# Patient Record
Sex: MALE | Race: WHITE | NOT HISPANIC OR LATINO | Employment: OTHER | URBAN - NONMETROPOLITAN AREA
[De-identification: names, ages, dates, MRNs, and addresses within clinical notes are randomized per-mention and may not be internally consistent; named-entity substitution may affect disease eponyms.]

---

## 2017-01-19 ENCOUNTER — OFFICE VISIT (OUTPATIENT)
Dept: CARDIOLOGY | Facility: CLINIC | Age: 77
End: 2017-01-19
Payer: MEDICARE

## 2017-01-19 VITALS
OXYGEN SATURATION: 94 % | HEIGHT: 72 IN | HEART RATE: 77 BPM | SYSTOLIC BLOOD PRESSURE: 140 MMHG | WEIGHT: 192 LBS | BODY MASS INDEX: 26.01 KG/M2 | DIASTOLIC BLOOD PRESSURE: 62 MMHG

## 2017-01-19 DIAGNOSIS — Z79.899 HIGH RISK MEDICATION USE: ICD-10-CM

## 2017-01-19 DIAGNOSIS — E78.2 MIXED HYPERLIPIDEMIA: ICD-10-CM

## 2017-01-19 DIAGNOSIS — I48.0 PAROXYSMAL ATRIAL FIBRILLATION (HCC): ICD-10-CM

## 2017-01-19 DIAGNOSIS — Z79.01 CHRONIC ANTICOAGULATION: ICD-10-CM

## 2017-01-19 LAB — EKG IMPRESSION: NORMAL

## 2017-01-19 PROCEDURE — 99215 OFFICE O/P EST HI 40 MIN: CPT | Performed by: INTERNAL MEDICINE

## 2017-01-19 PROCEDURE — 93000 ELECTROCARDIOGRAM COMPLETE: CPT | Performed by: INTERNAL MEDICINE

## 2017-01-19 RX ORDER — SOTALOL HYDROCHLORIDE 120 MG/1
120 TABLET ORAL 2 TIMES DAILY
Qty: 60 TAB | Refills: 11 | Status: SHIPPED | OUTPATIENT
Start: 2017-01-19 | End: 2018-01-16 | Stop reason: SDUPTHER

## 2017-01-19 ASSESSMENT — ENCOUNTER SYMPTOMS
DIZZINESS: 0
PALPITATIONS: 0
MYALGIAS: 0
CLAUDICATION: 0
SHORTNESS OF BREATH: 0
PND: 0
INSOMNIA: 0
SEIZURES: 0
BLURRED VISION: 0
DOUBLE VISION: 0
ABDOMINAL PAIN: 0
CHILLS: 0
HEADACHES: 0
NERVOUS/ANXIOUS: 0
BLOOD IN STOOL: 0
DEPRESSION: 0
FEVER: 0
ORTHOPNEA: 0

## 2017-01-19 NOTE — Clinical Note
[facility] [place] [address],   [address]nd [place] [address]  Phone: [phone]  Fax: [phone]              Sherwin Hennessy  1940    Encounter Date: 1/19/2017    Sandy Shepard M.D.          PROGRESS NOTE:  Subjective:   Sherwin Hennessy is a 76 y.o. male with known history of coronary atherosclerosis, paroxysmal atrial fibrillation on sotalol and also on anticoagulation with Eliquis, hypertension, dyslipidemia presenting today for follow-up evaluation of atrial fibrillation.    In spite of patient being on sotalol 80 mg BID he does have breakthrough episodes of atrial fibrillation at least once every 3-4 months. Does complain of fatigue and tiredness when he goes into A. fib. No complaints of exertional chest pain, pressure or tightness. Denied any complaints of dizziness, lightheadedness or LOC. No bleeding issues while on anti-coagulation. No complaints of claudication.    Past Medical History   Diagnosis Date   • History of chickenpox    • Heart disease    • History of measles    • History of mumps    • Hypertension    • Heartburn    • Arthritis    • Snoring    • Hiatal hernia      Past Surgical History   Procedure Laterality Date   • Vasectomy     • Inguinal hernia repair     • Eye surgery Bilateral      cataract sx     Family History   Problem Relation Age of Onset   • Hypertension Mother    • Heart Disease Father    • Cancer Sister    • Cancer Brother    • Arthritis Other    • Diabetes Other      History   Smoking status   • Never Smoker    Smokeless tobacco   • Never Used     Allergies   Allergen Reactions   • Amiodarone Unspecified     Felt like he was going to die   • Food      Watermelon, cantaloupe, strawberries, apples     Outpatient Encounter Prescriptions as of 1/19/2017   Medication Sig Dispense Refill   • hydrochlorothiazide (HYDRODIURIL) 12.5 MG tablet Take 12.5 mg by mouth every day.     • tramadol (ULTRAM) 50 MG Tab Take  1 Tab by mouth 2 Times a Day. 180 Tab 0   • terazosin (HYTRIN) 5 MG Cap Take 1 Cap by mouth every day. 90 Cap 1   • sotalol (BETAPACE) 80 MG Tab Take 1 Tab by mouth 2 times a day. 180 Tab 1   • simvastatin (ZOCOR) 10 MG Tab Take 1 Tab by mouth every evening. 60 Tab 3   • gabapentin (NEURONTIN) 300 MG Cap Take 1 Cap by mouth every day. 90 Cap 1   • predniSONE (DELTASONE) 5 MG Tab Take 1 Tab by mouth every day. 30 Tab 0   • apixaban (ELIQUIS) 5mg Tab Take 1 Tab by mouth 2 Times a Day. 180 Tab 1   • cephALEXin (KEFLEX) 500 MG Cap Take 1 Cap by mouth 4 times a day. 28 Cap 0   • omeprazole (PRILOSEC) 20 MG delayed-release capsule Take 1 Cap by mouth every day. 90 Cap 1   • Cyanocobalamin (VITAMIN B-12 PO) Take 2,000 mcg by mouth every day.       No facility-administered encounter medications on file as of 1/19/2017.     Review of Systems   Constitutional: Negative for fever and chills.   HENT: Positive for hearing loss. Negative for tinnitus.    Eyes: Negative for blurred vision and double vision.   Respiratory: Negative for shortness of breath.    Cardiovascular: Negative for chest pain, palpitations, orthopnea, claudication, leg swelling and PND.   Gastrointestinal: Negative for abdominal pain and blood in stool.   Genitourinary: Negative for dysuria and hematuria.   Musculoskeletal: Negative for myalgias and joint pain.   Skin: Negative for rash.   Neurological: Negative for dizziness, seizures and headaches.   Psychiatric/Behavioral: Negative for depression. The patient is not nervous/anxious and does not have insomnia.         Objective:   /62 mmHg  Pulse 77  Ht 1.829 m (6')  Wt 87.091 kg (192 lb)  BMI 26.03 kg/m2  SpO2 94%    Physical Exam   Constitutional: He is oriented to person, place, and time. He appears well-developed and well-nourished. No distress.   HENT:   Head: Normocephalic and atraumatic.   Mouth/Throat: No oropharyngeal exudate.   Eyes: Pupils are equal, round, and reactive to light. Right  eye exhibits no discharge. Left eye exhibits no discharge.   Neck: No JVD present. No tracheal deviation present.   Cardiovascular: Normal rate.    No murmur heard.  Pulmonary/Chest: Effort normal and breath sounds normal. No respiratory distress. He has no wheezes. He has no rales.   Abdominal: Soft. Bowel sounds are normal. He exhibits no distension. There is no tenderness. There is no rebound.   Musculoskeletal: Normal range of motion. He exhibits no edema.   Neurological: He is alert and oriented to person, place, and time. No cranial nerve deficit.   Skin: Skin is warm and dry. He is not diaphoretic. No erythema.   Psychiatric: He has a normal mood and affect.      EK17  EKG personally reviewed by me.  Sinus rhythm 62 bpm normal axis QTc interval of 451 ms.    CT chest: 12/15/16  1.  No evidence for parenchymal lung mass. The abnormality seen on chest x-ray likely represented artifact.  2.  Status post cholecystectomy with pneumobilia likely indicative of prior sphincterotomy.  3.  Coronary atherosclerosis    EK16  EKG personally reviewed by me.  Sinus rhythm 65 bpm normal axis no significant ST or T-wave changes.    Results for GUILLE UP (MRN [id number]) as of 2017 14:00   2016    Sodium 136 143    Potassium 3.0 (LL) 4.1    Chloride 99 107    Co2 28 29    Anion Gap 12 11    Glucose 152 (H) 109 (H)    Bun 22 (H) 16    Creatinine 0.9 1.1    GFR If Non African American >60 >60    Calcium 9.1 9.0    AST(SGOT) 72 (H) 23    ALT(SGPT) 92 (H) 23    Alkaline Phosphatase 238 (H) 80    Total Bilirubin 9.8 (H) 0.7    Albumin 3.2 (L) 3.8    Total Protein 7.0 7.2    A-G Ratio 0.8 1.1    Lipase 3270 (H)     Glycohemoglobin   5.8 (H)   Estim. Avg Glu   120   Cholesterol,Tot  149    Triglycerides  139    HDL  42.0    Non HDL Cholesterol  107    LDL  79    Chol-Hdl Ratio  3.55    Prostatic Specific Antigen Tot  5.19 (H)      Assessment:     1. Coronary atherosclerosis  2.  Paroxysmal atrial fibrillation  3. Chronic anticoagulation  4. Dyslipidemia    Medical Decision Making:  Today's Assessment / Status / Plan:     1. Patient had a nuclear stress test approximately 3 years ago in [place] according to patient stress test was negative for ischemia.  Offered him regular treadmill stress test which he refused.  2. She is having breakthrough episodes of atrial fibrillation while on sotalol 80 mg BID.  Will increase the dose to 120 mg BID-which is going to start next Monday.  Advised patient to come to our office for next 3 days for EKG formation QTc interval.  EKG from today showed QTc interval of 451 ms.   Labs from/16 showed GFR more than 60.  3. Continue Eliquis 5 mg BID.  Recent blood work showed normal renal function.  4. Increase Zocor to 20 mg qHs.  Target LDL less than 70.    Follow-up in 3-4 weeks.    Thank you for allowing me to participate in taking care of patient.    Sandy Gentile MD.      Shen Granger M.D.  [address] [facility] [address]  VIA In Basket

## 2017-01-19 NOTE — MR AVS SNAPSHOT
Sherwin Hennessy   2017 2:00 PM   Office Visit   MRN: [id number]    Department:  Heart [facility]   Dept Phone:  [phone]    Description:  Male : 1940   Provider:  Sandy Shepard M.D.           Reason for Visit     Follow-Up           Allergies as of 2017     Allergen Noted Reactions    Amiodarone 2016   Unspecified    [place] like he was going to die    Food 2016       Watermelon, cantaloupe, strawberries, apples      You were diagnosed with     Paroxysmal atrial fibrillation (CMS-HCC)   [850926]       Mixed hyperlipidemia   [272.2.ICD-9-CM]       Chronic anticoagulation   [242391]       High risk medication use   [303023]         Vital Signs     Blood Pressure Pulse Height Weight Body Mass Index Oxygen Saturation    140/62 mmHg 77 1.829 m (6') 87.091 kg (192 lb) 26.03 kg/m2 94%    Smoking Status                   Never Smoker            Basic Information     Date Of Birth Sex Race Ethnicity Preferred Language    1940 Male White Non- English      Your appointments     2017  1:10 PM   [facility]    [address]  [address]  [facility] [address] [phone]           No prep            2017 11:40 AM   [facility] with [facility]    [address]  [address]  [facility] [address] [phone]           No prep            2017  3:00 PM   FOLLOW UP with Sandy Shepard M.D.   [facility]    [address]  [address]  [facility] [address]   [phone]            2017  8:30 AM   15 Minute with Shen Granger M.D.   [facility] (--)    [address]  [facility] [address]   [phone]              Problem List              ICD-10-CM Priority Class Noted -  Resolved    Esophageal reflux K21.9   5/31/2016 - Present    Atrial fibrillation (CMS-HCC) I48.91   5/31/2016 - Present    BPH (benign prostatic hypertrophy) N40.0   5/31/2016 - Present    Hyperlipidemia E78.5   5/31/2016 - Present    Peripheral neuropathy (CMS-HCC) G62.9   5/31/2016 - Present    Elevated PSA R97.20   5/31/2016 - Present    Hiatal hernia K44.9   5/31/2016 - Present      Health Maintenance        Date Due Completion Dates    IMM DTaP/Tdap/Td Vaccine (1 - Tdap) 1/2/2010 1/1/2010    COLONOSCOPY 1/1/2025 1/1/2015 (Done)    Override on 1/1/2015: Done (per NP papers)            Current Immunizations     13-VALENT PCV PREVNAR 3/30/2016    Influenza TIV (IM) 5/1/2015    Influenza Vaccine Adult HD 10/25/2016  9:45 AM    Pneumococcal polysaccharide vaccine (PPSV-23) 1/1/2015    SHINGLES VACCINE 1/1/2015    TD Vaccine 1/1/2010      Below and/or attached are the medications your provider expects you to take. Review all of your home medications and newly ordered medications with your provider and/or pharmacist. Follow medication instructions as directed by your provider and/or pharmacist. Please keep your medication list with you and share with your provider. Update the information when medications are discontinued, doses are changed, or new medications (including over-the-counter products) are added; and carry medication information at all times in the event of emergency situations     Allergies:  AMIODARONE - Unspecified     FOOD - (reactions not documented)               Medications  Valid as of: January 19, 2017 -  2:20 PM    Generic Name Brand Name Tablet Size Instructions for use    Apixaban (Tab) ELIQUIS 5mg Take 1 Tab by mouth 2 Times a Day.        Cephalexin (Cap) KEFLEX 500 MG Take 1 Cap by mouth 4 times a day.        Cyanocobalamin   Take 2,000 mcg by mouth every day.        Gabapentin (Cap) NEURONTIN 300 MG Take 1 Cap by mouth every day.        HydroCHLOROthiazide (Tab) HYDRODIURIL 12.5 MG Take 12.5  mg by mouth every day.        Omeprazole (CAPSULE DELAYED RELEASE) PRILOSEC 20 MG Take 1 Cap by mouth every day.        PredniSONE (Tab) DELTASONE 5 MG Take 1 Tab by mouth every day.        Simvastatin (Tab) ZOCOR 10 MG Take 1 Tab by mouth every evening.        Sotalol HCl (Tab) BETAPACE 80 MG Take 1 Tab by mouth 2 times a day.        Terazosin HCl (Cap) HYTRIN 5 MG Take 1 Cap by mouth every day.        TraMADol HCl (Tab) ULTRAM 50 MG Take 1 Tab by mouth 2 Times a Day.        .                 Medicines prescribed today were sent to:     [facility][id number] - [place], [place] - [address]    [address] [place] [address]    Phone: [phone] Fax: [phone]    Open 24 Hours?: No      Medication refill instructions:       If your prescription bottle indicates you have medication refills left, it is not necessary to call your provider’s office. Please contact your pharmacy and they will refill your medication.    If your prescription bottle indicates you do not have any refills left, you may request refills at any time through one of the following ways: The online [employer] system (except Urgent Care), by calling your provider’s office, or by asking your pharmacy to contact your provider’s office with a refill request. Medication refills are processed only during regular business hours and may not be available until the next business day. Your provider may request additional information or to have a follow-up visit with you prior to refilling your medication.   *Please Note: Medication refills are assigned a new Rx number when refilled electronically. Your pharmacy may indicate that no refills were authorized even though a new prescription for the same medication is available at the pharmacy. Please request the medicine by name with the pharmacy before contacting your provider for a refill.        Your To Do List     Future Labs/Procedures Complete By Expires    Echocardiogram Comp w/o Cont  As  directed 1/19/2018         [employer] Access Code: Activation code not generated  Current [employer] Status: Active

## 2017-01-19 NOTE — PROGRESS NOTES
Subjective:   Sherwin Hennessy is a 76 y.o. male with known history of coronary atherosclerosis, paroxysmal atrial fibrillation on sotalol and also on anticoagulation with Eliquis, hypertension, dyslipidemia presenting today for follow-up evaluation of atrial fibrillation.    In spite of patient being on sotalol 80 mg BID he does have breakthrough episodes of atrial fibrillation at least once every 3-4 months. Does complain of fatigue and tiredness when he goes into A. fib. No complaints of exertional chest pain, pressure or tightness. Denied any complaints of dizziness, lightheadedness or LOC. No bleeding issues while on anti-coagulation. No complaints of claudication.    Past Medical History   Diagnosis Date   • History of chickenpox    • Heart disease    • History of measles    • History of mumps    • Hypertension    • Heartburn    • Arthritis    • Snoring    • Hiatal hernia      Past Surgical History   Procedure Laterality Date   • Vasectomy     • Inguinal hernia repair     • Eye surgery Bilateral      cataract sx     Family History   Problem Relation Age of Onset   • Hypertension Mother    • Heart Disease Father    • Cancer Sister    • Cancer Brother    • Arthritis Other    • Diabetes Other      History   Smoking status   • Never Smoker    Smokeless tobacco   • Never Used     Allergies   Allergen Reactions   • Amiodarone Unspecified     Felt like he was going to die   • Food      Watermelon, cantaloupe, strawberries, apples     Outpatient Encounter Prescriptions as of 1/19/2017   Medication Sig Dispense Refill   • hydrochlorothiazide (HYDRODIURIL) 12.5 MG tablet Take 12.5 mg by mouth every day.     • tramadol (ULTRAM) 50 MG Tab Take 1 Tab by mouth 2 Times a Day. 180 Tab 0   • terazosin (HYTRIN) 5 MG Cap Take 1 Cap by mouth every day. 90 Cap 1   • sotalol (BETAPACE) 80 MG Tab Take 1 Tab by mouth 2 times a day. 180 Tab 1   • simvastatin (ZOCOR) 10 MG Tab Take 1 Tab by mouth every evening. 60 Tab 3   • gabapentin  (NEURONTIN) 300 MG Cap Take 1 Cap by mouth every day. 90 Cap 1   • predniSONE (DELTASONE) 5 MG Tab Take 1 Tab by mouth every day. 30 Tab 0   • apixaban (ELIQUIS) 5mg Tab Take 1 Tab by mouth 2 Times a Day. 180 Tab 1   • cephALEXin (KEFLEX) 500 MG Cap Take 1 Cap by mouth 4 times a day. 28 Cap 0   • omeprazole (PRILOSEC) 20 MG delayed-release capsule Take 1 Cap by mouth every day. 90 Cap 1   • Cyanocobalamin (VITAMIN B-12 PO) Take 2,000 mcg by mouth every day.       No facility-administered encounter medications on file as of 1/19/2017.     Review of Systems   Constitutional: Negative for fever and chills.   HENT: Positive for hearing loss. Negative for tinnitus.    Eyes: Negative for blurred vision and double vision.   Respiratory: Negative for shortness of breath.    Cardiovascular: Negative for chest pain, palpitations, orthopnea, claudication, leg swelling and PND.   Gastrointestinal: Negative for abdominal pain and blood in stool.   Genitourinary: Negative for dysuria and hematuria.   Musculoskeletal: Negative for myalgias and joint pain.   Skin: Negative for rash.   Neurological: Negative for dizziness, seizures and headaches.   Psychiatric/Behavioral: Negative for depression. The patient is not nervous/anxious and does not have insomnia.         Objective:   /62 mmHg  Pulse 77  Ht 1.829 m (6')  Wt 87.091 kg (192 lb)  BMI 26.03 kg/m2  SpO2 94%    Physical Exam   Constitutional: He is oriented to person, place, and time. He appears well-developed and well-nourished. No distress.   HENT:   Head: Normocephalic and atraumatic.   Mouth/Throat: No oropharyngeal exudate.   Eyes: Pupils are equal, round, and reactive to light. Right eye exhibits no discharge. Left eye exhibits no discharge.   Neck: No JVD present. No tracheal deviation present.   Cardiovascular: Normal rate.    No murmur heard.  Pulmonary/Chest: Effort normal and breath sounds normal. No respiratory distress. He has no wheezes. He has no  rales.   Abdominal: Soft. Bowel sounds are normal. He exhibits no distension. There is no tenderness. There is no rebound.   Musculoskeletal: Normal range of motion. He exhibits no edema.   Neurological: He is alert and oriented to person, place, and time. No cranial nerve deficit.   Skin: Skin is warm and dry. He is not diaphoretic. No erythema.   Psychiatric: He has a normal mood and affect.      EK17  EKG personally reviewed by me.  Sinus rhythm 62 bpm normal axis QTc interval of 451 ms.    CT chest: 12/15/16  1.  No evidence for parenchymal lung mass. The abnormality seen on chest x-ray likely represented artifact.  2.  Status post cholecystectomy with pneumobilia likely indicative of prior sphincterotomy.  3.  Coronary atherosclerosis    EK16  EKG personally reviewed by me.  Sinus rhythm 65 bpm normal axis no significant ST or T-wave changes.    Results for GUILLE UP (MRN [id number]) as of 2017 14:00   2016    Sodium 136 143    Potassium 3.0 (LL) 4.1    Chloride 99 107    Co2 28 29    Anion Gap 12 11    Glucose 152 (H) 109 (H)    Bun 22 (H) 16    Creatinine 0.9 1.1    GFR If Non African American >60 >60    Calcium 9.1 9.0    AST(SGOT) 72 (H) 23    ALT(SGPT) 92 (H) 23    Alkaline Phosphatase 238 (H) 80    Total Bilirubin 9.8 (H) 0.7    Albumin 3.2 (L) 3.8    Total Protein 7.0 7.2    A-G Ratio 0.8 1.1    Lipase 3270 (H)     Glycohemoglobin   5.8 (H)   Estim. Avg Glu   120   Cholesterol,Tot  149    Triglycerides  139    HDL  42.0    Non HDL Cholesterol  107    LDL  79    Chol-Hdl Ratio  3.55    Prostatic Specific Antigen Tot  5.19 (H)      Assessment:     1. Coronary atherosclerosis  2. Paroxysmal atrial fibrillation  3. Chronic anticoagulation  4. Dyslipidemia    Medical Decision Making:  Today's Assessment / Status / Plan:     1. Patient had a nuclear stress test approximately 3 years ago in [place] according to patient stress test was negative for  ischemia.  Offered him regular treadmill stress test which he refused.  2. She is having breakthrough episodes of atrial fibrillation while on sotalol 80 mg BID.  Will increase the dose to 120 mg BID-which is going to start next Monday.  Advised patient to come to our office for next 3 days for EKG formation QTc interval.  EKG from today showed QTc interval of 451 ms.   Labs from/16 showed GFR more than 60.  3. Continue Eliquis 5 mg BID.  Recent blood work showed normal renal function.  4. Increase Zocor to 20 mg qHs.  Target LDL less than 70.    Follow-up in 3-4 weeks.    Thank you for allowing me to participate in taking care of patient.    Sandy Gentile MD.

## 2017-01-24 ENCOUNTER — NON-PROVIDER VISIT (OUTPATIENT)
Dept: CARDIOLOGY | Facility: CLINIC | Age: 77
End: 2017-01-24
Payer: MEDICARE

## 2017-01-26 ENCOUNTER — OFFICE VISIT (OUTPATIENT)
Dept: CARDIOLOGY | Facility: CLINIC | Age: 77
End: 2017-01-26
Payer: MEDICARE

## 2017-01-26 ENCOUNTER — NON-PROVIDER VISIT (OUTPATIENT)
Dept: CARDIOLOGY | Facility: CLINIC | Age: 77
End: 2017-01-26
Payer: MEDICARE

## 2017-01-26 VITALS
HEIGHT: 72 IN | SYSTOLIC BLOOD PRESSURE: 120 MMHG | BODY MASS INDEX: 26.01 KG/M2 | HEART RATE: 69 BPM | WEIGHT: 192 LBS | DIASTOLIC BLOOD PRESSURE: 70 MMHG

## 2017-01-26 DIAGNOSIS — I48.0 PAROXYSMAL ATRIAL FIBRILLATION (HCC): ICD-10-CM

## 2017-01-26 DIAGNOSIS — E78.2 MIXED HYPERLIPIDEMIA: ICD-10-CM

## 2017-01-26 DIAGNOSIS — Z79.01 CHRONIC ANTICOAGULATION: ICD-10-CM

## 2017-01-26 PROCEDURE — G8420 CALC BMI NORM PARAMETERS: HCPCS | Performed by: INTERNAL MEDICINE

## 2017-01-26 PROCEDURE — 99214 OFFICE O/P EST MOD 30 MIN: CPT | Performed by: INTERNAL MEDICINE

## 2017-01-26 PROCEDURE — 93000 ELECTROCARDIOGRAM COMPLETE: CPT | Performed by: INTERNAL MEDICINE

## 2017-01-26 PROCEDURE — 1101F PT FALLS ASSESS-DOCD LE1/YR: CPT | Performed by: INTERNAL MEDICINE

## 2017-01-26 PROCEDURE — 4040F PNEUMOC VAC/ADMIN/RCVD: CPT | Performed by: INTERNAL MEDICINE

## 2017-01-26 PROCEDURE — G8598 ASA/ANTIPLAT THER USED: HCPCS | Performed by: INTERNAL MEDICINE

## 2017-01-26 PROCEDURE — G8432 DEP SCR NOT DOC, RNG: HCPCS | Performed by: INTERNAL MEDICINE

## 2017-01-26 PROCEDURE — G8482 FLU IMMUNIZE ORDER/ADMIN: HCPCS | Performed by: INTERNAL MEDICINE

## 2017-01-26 PROCEDURE — 1036F TOBACCO NON-USER: CPT | Performed by: INTERNAL MEDICINE

## 2017-01-26 RX ORDER — ATORVASTATIN CALCIUM 20 MG/1
20 TABLET, FILM COATED ORAL DAILY
Qty: 90 TAB | Refills: 3 | Status: SHIPPED | OUTPATIENT
Start: 2017-01-26 | End: 2018-01-09

## 2017-01-26 NOTE — MR AVS SNAPSHOT
Sherwin Hennessy   2017 8:20 AM   Office Visit   MRN: [id number]    Department:  [facility]   Dept Phone:  [phone]    Description:  Male : 1940   Provider:  Willie Julian MD,[facility]           Reason for Visit     Follow-Up           Allergies as of 2017     Allergen Noted Reactions    Amiodarone 2016   Unspecified    [place] like he was going to die    Food 2016       Watermelon, cantaloupe, strawberries, apples      You were diagnosed with     Paroxysmal atrial fibrillation (CMS-HCC)   [988735]       Mixed hyperlipidemia   [272.2.ICD-9-CM]       Chronic anticoagulation   [401924]         Vital Signs     Blood Pressure Pulse Height Weight Body Mass Index Smoking Status    120/70 mmHg 69 1.829 m (6') 87.091 kg (192 lb) 26.03 kg/m2 Never Smoker       Basic Information     Date Of Birth Sex Race Ethnicity Preferred Language    1940 Male White Non- English      Your appointments     2017 11:40 AM   [facility]    [address] [address]  [address]  [facility] [address] [phone]           No prep            2017  3:00 PM   FOLLOW UP with Sandy Shepard M.D.   [facility]    [address] [address]  [address]  [facility] [address] [phone]            2017  8:30 AM   15 Minute with Shen Granger M.D.   [facility] (--)    [address] [place] [address] [address]  [facility] [id number] [phone]              Problem List              ICD-10-CM Priority Class Noted - Resolved    Esophageal reflux K21.9   2016 - Present    Atrial fibrillation (CMS-HCC) I48.91   2016 - Present    BPH (benign prostatic hypertrophy) N40.0   2016 - Present    Hyperlipidemia E78.5   2016 - Present    Peripheral neuropathy (CMS-HCC) G62.9   2016 - Present    Elevated PSA  R97.20   5/31/2016 - Present    Hiatal hernia K44.9   5/31/2016 - Present      Health Maintenance        Date Due Completion Dates    IMM DTaP/Tdap/Td Vaccine (1 - Tdap) 1/2/2010 1/1/2010    COLONOSCOPY 1/1/2025 1/1/2015 (Done)    Override on 1/1/2015: Done (per NP papers)            Current Immunizations     13-VALENT PCV PREVNAR 3/30/2016    Influenza TIV (IM) 5/1/2015    Influenza Vaccine Adult HD 10/25/2016  9:45 AM    Pneumococcal polysaccharide vaccine (PPSV-23) 1/1/2015    SHINGLES VACCINE 1/1/2015    TD Vaccine 1/1/2010      Below and/or attached are the medications your provider expects you to take. Review all of your home medications and newly ordered medications with your provider and/or pharmacist. Follow medication instructions as directed by your provider and/or pharmacist. Please keep your medication list with you and share with your provider. Update the information when medications are discontinued, doses are changed, or new medications (including over-the-counter products) are added; and carry medication information at all times in the event of emergency situations     Allergies:  AMIODARONE - Unspecified     FOOD - (reactions not documented)               Medications  Valid as of: January 26, 2017 -  9:11 AM    Generic Name Brand Name Tablet Size Instructions for use    Apixaban (Tab) ELIQUIS 5mg Take 1 Tab by mouth 2 Times a Day.        Atorvastatin Calcium (Tab) LIPITOR 20 MG Take 1 Tab by mouth every day.        Cephalexin (Cap) KEFLEX 500 MG Take 1 Cap by mouth 4 times a day.        Cyanocobalamin   Take 2,000 mcg by mouth every day.        Gabapentin (Cap) NEURONTIN 300 MG Take 1 Cap by mouth every day.        HydroCHLOROthiazide (Tab) HYDRODIURIL 12.5 MG Take 12.5 mg by mouth every day.        Omeprazole (CAPSULE DELAYED RELEASE) PRILOSEC 20 MG Take 1 Cap by mouth every day.        PredniSONE (Tab) DELTASONE 5 MG Take 1 Tab by mouth every day.        Sotalol HCl (Tab) BETAPACE 120 MG Take 1  Tab by mouth 2 times a day.        Terazosin HCl (Cap) HYTRIN 5 MG Take 1 Cap by mouth every day.        TraMADol HCl (Tab) ULTRAM 50 MG Take 1 Tab by mouth 2 Times a Day.        .                 Medicines prescribed today were sent to:     [facility] #[id number] - [place], [place] - [address]    [address]  [place] [address]    Phone: [phone] Fax: [phone]    Open 24 Hours?: No      Medication refill instructions:       If your prescription bottle indicates you have medication refills left, it is not necessary to call your provider’s office. Please contact your pharmacy and they will refill your medication.    If your prescription bottle indicates you do not have any refills left, you may request refills at any time through one of the following ways: The online Payfirma system (except Urgent Care), by calling your provider’s office, or by asking your pharmacy to contact your provider’s office with a refill request. Medication refills are processed only during regular business hours and may not be available until the next business day. Your provider may request additional information or to have a follow-up visit with you prior to refilling your medication.   *Please Note: Medication refills are assigned a new Rx number when refilled electronically. Your pharmacy may indicate that no refills were authorized even though a new prescription for the same medication is available at the pharmacy. Please request the medicine by name with the pharmacy before contacting your provider for a refill.           Payfirma Access Code: Activation code not generated  Current Pay[employer]ir[employer] Status: Active

## 2017-01-26 NOTE — PROGRESS NOTES
Chief Complaint   Patient presents with   • Follow-Up       This patient is an established male who is here today to discuss:  Recheck FLP    Patient Active Problem List    Diagnosis Date Noted   • Esophageal reflux 05/31/2016   • Atrial fibrillation (CMS-[facility]) 05/31/2016   • BPH (benign prostatic hypertrophy) 05/31/2016   • Hyperlipidemia 05/31/2016   • Peripheral neuropathy (CMS-HCC) 05/31/2016   • Elevated PSA 05/31/2016   • Hiatal hernia 05/31/2016       Past Medical History   Diagnosis Date   • History of chickenpox    • Heart disease    • History of measles    • History of mumps    • Hypertension    • Heartburn    • Arthritis    • Snoring    • Hiatal hernia      Past Surgical History   Procedure Laterality Date   • Vasectomy     • Inguinal hernia repair     • Eye surgery Bilateral      cataract sx       Current Outpatient Prescriptions   Medication Sig Dispense Refill   • atorvastatin (LIPITOR) 20 MG Tab Take 1 Tab by mouth every day. 90 Tab 3   • sotalol (BETAPACE) 120 MG tablet Take 1 Tab by mouth 2 times a day. 60 Tab 11   • hydrochlorothiazide (HYDRODIURIL) 12.5 MG tablet Take 12.5 mg by mouth every day.     • tramadol (ULTRAM) 50 MG Tab Take 1 Tab by mouth 2 Times a Day. 180 Tab 0   • apixaban (ELIQUIS) 5mg Tab Take 1 Tab by mouth 2 Times a Day. 180 Tab 1   • terazosin (HYTRIN) 5 MG Cap Take 1 Cap by mouth every day. 90 Cap 1   • predniSONE (DELTASONE) 5 MG Tab Take 1 Tab by mouth every day. 30 Tab 0   • cephALEXin (KEFLEX) 500 MG Cap Take 1 Cap by mouth 4 times a day. 28 Cap 0   • gabapentin (NEURONTIN) 300 MG Cap Take 1 Cap by mouth every day. 90 Cap 1   • omeprazole (PRILOSEC) 20 MG delayed-release capsule Take 1 Cap by mouth every day. 90 Cap 1   • Cyanocobalamin (VITAMIN B-12 PO) Take 2,000 mcg by mouth every day.       No current facility-administered medications for this visit.     Amiodarone and Food      Review of Systems:     Constitutional: Denies fevers, Denies weight changes  Eyes: Denies  changes in vision, no eye pain  Ears/Nose/Throat/Mouth: Denies nasal congestion or sore throat   Cardiovascular: Denies chest pain or palpitations   Respiratory: Denies shortness of breath , Denies cough  Gastrointestinal/Hepatic: Denies abdominal pain, nausea, vomiting, diarrhea, constipation or GI bleeding   Genitourinary: Denies bladder dysfunction, dysuria or frequency  Musculoskeletal/Rheum: Denies  joint pain and swelling   Skin/Breast: Denies rash, denies breast lumps or discharge  Neurological: Denies headache, confusion, memory loss or focal weakness/parasthesias  Psychiatric: denies mood disorder   Endocrine: denies hx of diabetes or thyroid dysfunction  Heme/Oncology/Lymph Nodes: Denies enlarged lymph nodes, denies brusing or known bleeding disorder  Allergic/Immunologic: Denies hx of allergies      All other systems were reviewed and are negative (AMA/CMS criteria)      Blood pressure 120/70, pulse 69, height 1.829 m (6'), weight 87.091 kg (192 lb).  General Appearance:   Well developed, Well nourished, No acute distress, Non-toxic appearance.   HENT:  Normocephalic, Atraumatic, Oropharynx moist mucous membranes, Dentition: Mallampati 4 OP, Nose normal.    Eyes:  PERRLA, EOMI, Conjunctiva normal, No discharge.  Neck:  Normal range of motion, No cervical tenderness, Supple, No stridor, no JVD .  No thyromegaly.  No carotid bruit.  Cardiovascular:  Normal heart rate, Normal rhythm,  S1, S2, no S3,  S4; No gallops; No murmurs, No rubs, .   Extremitites with intact distal pulses, no cyanosis, clubbing or edema.  No heaves, thrills, HJR;  Peripheral pulses: carotid 2+, brachial 2+, radial 2+, ulnar 2+, femoral 2+, popliteal 2+, PT 2+, DP 2+;  Lungs:  Respiratory effort is normal. Normal breath sounds, breath sounds clear to auscultation bilaterally,  no rales, no rhonchi, no wheezing.   Abdomen: Bowel sounds normal, Soft, No tenderness, No guarding, No rebound, No masses, No hepatosplenomegaly.  Skin: Warm,  Dry, No erythema, No rash, no induration or crepitus.  Neurologic: Alert & oriented x 3, Normal motor function, Normal sensory function, No focal deficits noted, cranial nerves II through XII are normal,  normal gait.  Psychiatric: Affect normal, Judgment normal, Mood normal.  Results for GUILLE UP (MRN [id number]) as of 1/26/2017 08:38   Ref. Range 12/9/2016 08:58 12/12/2016 08:25 12/12/2016 08:33   Glycohemoglobin Latest Ref Range: <6.0 %   5.8 (H)   Estim. Avg Glu Latest Units: mg/dL   120   Cholesterol,Tot Latest Ref Range: 120-200 mg/dL  149    Triglycerides Latest Ref Range: 0-150 mg/dL  139    HDL Latest Ref Range: 40.0-60.0 mg/dL  42.0    Non HDL Cholesterol Latest Ref Range:    107    LDL Latest Ref Range: <100 mg/dL  79    Chol-Hdl Ratio Unknown  3.55      Assessment and Plan.   76 y.o. male has high CV risk about 30% will change Statin to high intensity class, Lipitor,  and titrate up to 40-80 mg/d range; recheck FLP in 3 months  Sotalol tolerating with QTc from 465 to 485 msec after since 1/17 2017 from 90 to 120 mg bid; will follow closelystill in SR, no need for scheduled cardioversion for now  Start ASA 81 mg/d    1. Paroxysmal atrial fibrillation (CMS-HCC)  Now in SR    2. Mixed hyperlipidemia    - atorvastatin (LIPITOR) 20 MG Tab; Take 1 Tab by mouth every day.  Dispense: 90 Tab; Refill: 3  - LIPID PANEL    3. Chronic anticoagulation  No bleeding      1. Paroxysmal atrial fibrillation (CMS-HCC)     2. Mixed hyperlipidemia  atorvastatin (LIPITOR) 20 MG Tab    LIPID PANEL    LIPID PANEL   3. Chronic anticoagulation

## 2017-01-26 NOTE — Clinical Note
[facility] [address],   [address] [place] [address]  Phone: [phone]  Fax: [phone]              Sherwin Hennessy  1940    Encounter Date: 1/26/2017    Shen Granger M.D.    Thank you for the referral. I had the pleasure of seeing Sherwin Hennessy today in cardiology clinic. I've attached my visit note below. If you have any questions please feel free to give me a call anytime.      Willie Julian MD, PhD, [facility]  Cardiology and Lipidology  [facility]                                                                    PROGRESS NOTE:  Chief Complaint   Patient presents with   • Follow-Up       This patient is an established male who is here today to discuss:  Recheck FLP    Patient Active Problem List    Diagnosis Date Noted   • Esophageal reflux 05/31/2016   • Atrial fibrillation (CMS-HCC) 05/31/2016   • BPH (benign prostatic hypertrophy) 05/31/2016   • Hyperlipidemia 05/31/2016   • Peripheral neuropathy (CMS-HCC) 05/31/2016   • Elevated PSA 05/31/2016   • Hiatal hernia 05/31/2016       Past Medical History   Diagnosis Date   • History of chickenpox    • Heart disease    • History of measles    • History of mumps    • Hypertension    • Heartburn    • Arthritis    • Snoring    • Hiatal hernia      Past Surgical History   Procedure Laterality Date   • Vasectomy     • Inguinal hernia repair     • Eye surgery Bilateral      cataract sx       Current Outpatient Prescriptions   Medication Sig Dispense Refill   • atorvastatin (LIPITOR) 20 MG Tab Take 1 Tab by mouth every day. 90 Tab 3   • sotalol (BETAPACE) 120 MG tablet Take 1 Tab by mouth 2 times a day. 60 Tab 11   • hydrochlorothiazide (HYDRODIURIL) 12.5 MG tablet Take 12.5 mg by mouth every day.     • tramadol (ULTRAM) 50 MG Tab Take 1 Tab by mouth 2 Times a Day. 180 Tab 0   • apixaban (ELIQUIS) 5mg Tab Take 1 Tab by mouth 2 Times a Day. 180 Tab 1     • terazosin (HYTRIN) 5 MG Cap Take 1 Cap by mouth every day. 90 Cap 1   • predniSONE (DELTASONE) 5 MG Tab Take 1 Tab by mouth every day. 30 Tab 0   • cephALEXin (KEFLEX) 500 MG Cap Take 1 Cap by mouth 4 times a day. 28 Cap 0   • gabapentin (NEURONTIN) 300 MG Cap Take 1 Cap by mouth every day. 90 Cap 1   • omeprazole (PRILOSEC) 20 MG delayed-release capsule Take 1 Cap by mouth every day. 90 Cap 1   • Cyanocobalamin (VITAMIN B-12 PO) Take 2,000 mcg by mouth every day.       No current facility-administered medications for this visit.     Amiodarone and Food      Review of Systems:     Constitutional: Denies fevers, Denies weight changes  Eyes: Denies changes in vision, no eye pain  Ears/Nose/Throat/Mouth: Denies nasal congestion or sore throat   Cardiovascular: Denies chest pain or palpitations   Respiratory: Denies shortness of breath , Denies cough  Gastrointestinal/Hepatic: Denies abdominal pain, nausea, vomiting, diarrhea, constipation or GI bleeding   Genitourinary: Denies bladder dysfunction, dysuria or frequency  Musculoskeletal/Rheum: Denies  joint pain and swelling   Skin/Breast: Denies rash, denies breast lumps or discharge  Neurological: Denies headache, confusion, memory loss or focal weakness/parasthesias  Psychiatric: denies mood disorder   Endocrine: denies hx of diabetes or thyroid dysfunction  Heme/Oncology/Lymph Nodes: Denies enlarged lymph nodes, denies brusing or known bleeding disorder  Allergic/Immunologic: Denies hx of allergies      All other systems were reviewed and are negative (AMA/CMS criteria)      Blood pressure 120/70, pulse 69, height 1.829 m (6'), weight 87.091 kg (192 lb).  General Appearance:   Well developed, Well nourished, No acute distress, Non-toxic appearance.   HENT:  Normocephalic, Atraumatic, Oropharynx moist mucous membranes, Dentition: Mallampati 4 OP, Nose normal.    Eyes:  PERRLA, EOMI, Conjunctiva normal, No discharge.  Neck:  Normal range of motion, No cervical  tenderness, Supple, No stridor, no JVD .  No thyromegaly.  No carotid bruit.  Cardiovascular:  Normal heart rate, Normal rhythm,  S1, S2, no S3,  S4; No gallops; No murmurs, No rubs, .   Extremitites with intact distal pulses, no cyanosis, clubbing or edema.  No heaves, thrills, HJR;  Peripheral pulses: carotid 2+, brachial 2+, radial 2+, ulnar 2+, femoral 2+, popliteal 2+, PT 2+, DP 2+;  Lungs:  Respiratory effort is normal. Normal breath sounds, breath sounds clear to auscultation bilaterally,  no rales, no rhonchi, no wheezing.   Abdomen: Bowel sounds normal, Soft, No tenderness, No guarding, No rebound, No masses, No hepatosplenomegaly.  Skin: Warm, Dry, No erythema, No rash, no induration or crepitus.  Neurologic: Alert & oriented x 3, Normal motor function, Normal sensory function, No focal deficits noted, cranial nerves II through XII are normal,  normal gait.  Psychiatric: Affect normal, Judgment normal, Mood normal.  Results for GUILLE UP (MRN [id number]) as of 1/26/2017 08:38   Ref. Range 12/9/2016 08:58 12/12/2016 08:25 12/12/2016 08:33   Glycohemoglobin Latest Ref Range: <6.0 %   5.8 (H)   Estim. Avg Glu Latest Units: mg/dL   120   Cholesterol,Tot Latest Ref Range: 120-200 mg/dL  149    Triglycerides Latest Ref Range: 0-150 mg/dL  139    HDL Latest Ref Range: 40.0-60.0 mg/dL  42.0    Non HDL Cholesterol Latest Ref Range:    107    LDL Latest Ref Range: <100 mg/dL  79    Chol-Hdl Ratio Unknown  3.55      Assessment and Plan.   76 y.o. male has high CV risk about 30% will change Statin to high intensity class, Lipitor,  and titrate up to 40-80 mg/d range; recheck FLP in 3 months  Sotalol tolerating with QTc from 465 to 485 msec after since 1/17 2017 from 90 to 120 mg bid; will follow closelystill in SR, no need for scheduled cardioversion for now  Start ASA 81 mg/d    1. Paroxysmal atrial fibrillation (CMS-HCC)  Now in SR    2. Mixed hyperlipidemia    - atorvastatin (LIPITOR) 20 MG Tab; Take 1 Tab  by mouth every day.  Dispense: 90 Tab; Refill: 3  - LIPID PANEL    3. Chronic anticoagulation  No bleeding      1. Paroxysmal atrial fibrillation (CMS-HCC)     2. Mixed hyperlipidemia  atorvastatin (LIPITOR) 20 MG Tab    LIPID PANEL   3. Chronic anticoagulation               Shen Granger M.D.  [address] [facility] [address]  VIA In Basket

## 2017-02-14 ENCOUNTER — OFFICE VISIT (OUTPATIENT)
Dept: CARDIOLOGY | Facility: CLINIC | Age: 77
End: 2017-02-14
Payer: MEDICARE

## 2017-02-14 VITALS
BODY MASS INDEX: 26.01 KG/M2 | HEART RATE: 62 BPM | OXYGEN SATURATION: 94 % | SYSTOLIC BLOOD PRESSURE: 130 MMHG | WEIGHT: 192 LBS | DIASTOLIC BLOOD PRESSURE: 60 MMHG | HEIGHT: 72 IN

## 2017-02-14 DIAGNOSIS — I48.0 PAROXYSMAL ATRIAL FIBRILLATION (HCC): ICD-10-CM

## 2017-02-14 DIAGNOSIS — E78.2 MIXED HYPERLIPIDEMIA: ICD-10-CM

## 2017-02-14 DIAGNOSIS — Z79.01 CHRONIC ANTICOAGULATION: ICD-10-CM

## 2017-02-14 LAB — EKG IMPRESSION: NORMAL

## 2017-02-14 PROCEDURE — G8420 CALC BMI NORM PARAMETERS: HCPCS | Performed by: INTERNAL MEDICINE

## 2017-02-14 PROCEDURE — G8432 DEP SCR NOT DOC, RNG: HCPCS | Performed by: INTERNAL MEDICINE

## 2017-02-14 PROCEDURE — 99215 OFFICE O/P EST HI 40 MIN: CPT | Performed by: INTERNAL MEDICINE

## 2017-02-14 PROCEDURE — G8598 ASA/ANTIPLAT THER USED: HCPCS | Performed by: INTERNAL MEDICINE

## 2017-02-14 PROCEDURE — G8482 FLU IMMUNIZE ORDER/ADMIN: HCPCS | Performed by: INTERNAL MEDICINE

## 2017-02-14 PROCEDURE — 1101F PT FALLS ASSESS-DOCD LE1/YR: CPT | Performed by: INTERNAL MEDICINE

## 2017-02-14 PROCEDURE — 93000 ELECTROCARDIOGRAM COMPLETE: CPT | Performed by: INTERNAL MEDICINE

## 2017-02-14 PROCEDURE — 4040F PNEUMOC VAC/ADMIN/RCVD: CPT | Performed by: INTERNAL MEDICINE

## 2017-02-14 PROCEDURE — 1036F TOBACCO NON-USER: CPT | Performed by: INTERNAL MEDICINE

## 2017-02-14 ASSESSMENT — ENCOUNTER SYMPTOMS
SEIZURES: 0
CLAUDICATION: 0
DIZZINESS: 0
INSOMNIA: 0
DEPRESSION: 0
DOUBLE VISION: 0
PALPITATIONS: 0
CHILLS: 0
PND: 0
BLOOD IN STOOL: 0
FEVER: 0
ORTHOPNEA: 0
MYALGIAS: 0
ABDOMINAL PAIN: 0
SHORTNESS OF BREATH: 0
BLURRED VISION: 0
HEADACHES: 0
NERVOUS/ANXIOUS: 0

## 2017-02-14 NOTE — Clinical Note
[facility] [address],   [address] [place] [address]  Phone: [phone]  Fax: [phone]              Sherwin Hennessy  1940    Encounter Date: 2/14/2017    Sandy Shepard M.D.          PROGRESS NOTE:  Subjective:   Sherwin Hennessy is a 76 y.o. male with known history of coronary atherosclerosis, paroxysmal atrial fibrillation on sotalol and also on anticoagulation with Eliquis, hypertension, dyslipidemia presenting today for follow-up evaluation of atrial fibrillation.    Since sotalol dose was increased to 120 mg BID, patient denied any further breakthrough episodes of atrial fibrillation. No bleeding issues while on antibiotics. No complaints of exertional chest pain, pressure or tightness. Denied any complaints of dizziness, lightheadedness or LOC.    Past Medical History   Diagnosis Date   • History of chickenpox    • Heart disease    • History of measles    • History of mumps    • Hypertension    • Heartburn    • Arthritis    • Snoring    • Hiatal hernia      Past Surgical History   Procedure Laterality Date   • Vasectomy     • Inguinal hernia repair     • Eye surgery Bilateral      cataract sx     Family History   Problem Relation Age of Onset   • Hypertension Mother    • Heart Disease Father    • Cancer Sister    • Cancer Brother    • Arthritis Other    • Diabetes Other      History   Smoking status   • Never Smoker    Smokeless tobacco   • Never Used     Allergies   Allergen Reactions   • Amiodarone Unspecified     Felt like he was going to die   • Food      Watermelon, cantaloupe, strawberries, apples     Outpatient Encounter Prescriptions as of 2/14/2017   Medication Sig Dispense Refill   • gabapentin (NEURONTIN) 300 MG Cap Take 1 Cap by mouth every day. 90 Cap 1   • atorvastatin (LIPITOR) 20 MG Tab Take 1 Tab by mouth every day. 90 Tab 3   • sotalol (BETAPACE) 120 MG tablet Take 1 Tab by mouth 2 times a day. 60 Tab 11   •  hydrochlorothiazide (HYDRODIURIL) 12.5 MG tablet Take 12.5 mg by mouth every day.     • tramadol (ULTRAM) 50 MG Tab Take 1 Tab by mouth 2 Times a Day. 180 Tab 0   • terazosin (HYTRIN) 5 MG Cap Take 1 Cap by mouth every day. 90 Cap 1   • predniSONE (DELTASONE) 5 MG Tab Take 1 Tab by mouth every day. 30 Tab 0   • apixaban (ELIQUIS) 5mg Tab Take 1 Tab by mouth 2 Times a Day. 180 Tab 1   • cephALEXin (KEFLEX) 500 MG Cap Take 1 Cap by mouth 4 times a day. 28 Cap 0   • omeprazole (PRILOSEC) 20 MG delayed-release capsule Take 1 Cap by mouth every day. 90 Cap 1   • Cyanocobalamin (VITAMIN B-12 PO) Take 2,000 mcg by mouth every day.       No facility-administered encounter medications on file as of 2/14/2017.     Review of Systems   Constitutional: Negative for fever and chills.   HENT: Positive for hearing loss. Negative for tinnitus.    Eyes: Negative for blurred vision and double vision.   Respiratory: Negative for shortness of breath.    Cardiovascular: Negative for chest pain, palpitations, orthopnea, claudication, leg swelling and PND.   Gastrointestinal: Negative for abdominal pain and blood in stool.   Genitourinary: Negative for dysuria and hematuria.   Musculoskeletal: Negative for myalgias and joint pain.   Skin: Negative for rash.   Neurological: Negative for dizziness, seizures and headaches.   Psychiatric/Behavioral: Negative for depression. The patient is not nervous/anxious and does not have insomnia.         Objective:   /60 mmHg  Pulse 62  Ht 1.829 m (6')  Wt 87.091 kg (192 lb)  BMI 26.03 kg/m2  SpO2 94%    Physical Exam   Constitutional: He is oriented to person, place, and time. He appears well-developed and well-nourished. No distress.   HENT:   Head: Normocephalic and atraumatic.   Mouth/Throat: No oropharyngeal exudate.   Eyes: Pupils are equal, round, and reactive to light. Right eye exhibits no discharge. Left eye exhibits no discharge.   Neck: No JVD present. No tracheal deviation  present.   Cardiovascular: Regular rhythm.  Bradycardia present.    No murmur heard.  Pulmonary/Chest: Effort normal and breath sounds normal. No respiratory distress. He has no wheezes. He has no rales.   Abdominal: Soft. Bowel sounds are normal. He exhibits no distension. There is no tenderness. There is no rebound.   Musculoskeletal: Normal range of motion. He exhibits no edema.   Neurological: He is alert and oriented to person, place, and time. No cranial nerve deficit.   Skin: Skin is warm and dry. He is not diaphoretic. No erythema.   Psychiatric: He has a normal mood and affect.      EK17  EKG personally reviewed by me.  Sinus bradycardia 55 bpm normal axis  ms.    EK17  EKG personally reviewed by me.  Sinus rhythm 62 bpm normal axis QTc interval of 451 ms.    CT chest: 12/15/16  1.  No evidence for parenchymal lung mass. The abnormality seen on chest x-ray likely represented artifact.  2.  Status post cholecystectomy with pneumobilia likely indicative of prior sphincterotomy.  3.  Coronary atherosclerosis    EK16  EKG personally reviewed by me.  Sinus rhythm 65 bpm normal axis no significant ST or T-wave changes.    Results for GUILLE UP (MRN [id number]) as of 2017 15:15   2016    Sodium 143    Potassium 4.1    Chloride 107    Co2 29    Anion Gap 11    Glucose 109 (H)    Bun 16    Creatinine 1.1    GFR If Non African American >60    Calcium 9.0    AST(SGOT) 23    ALT(SGPT) 23    Alkaline Phosphatase 80    Cholesterol,Tot 149 114 (L)   Triglycerides 139 66   HDL 42.0 43.0   Non HDL Cholesterol 107 71   LDL 79 58   Chol-Hdl Ratio 3.55 2.65     Assessment:     1. Coronary atherosclerosis  2. Paroxysmal atrial fibrillation  3. Chronic anticoagulation  4. Dyslipidemia    Medical Decision Making:  Today's Assessment / Status / Plan:     1. Patient had a nuclear stress test approximately 3 years ago in [place] according to patient stress test was negative  for ischemia.  Offered him regular treadmill stress test which he refused.  2. She is having breakthrough episodes of atrial fibrillation while on sotalol 80 mg BID.  Will increase the dose to 120 mg BID-which is going to start next Monday.  Advised patient to come to our office for next 3 days for EKG formation QTc interval.  EKG from today showed QTc interval of 451 ms.   Labs from/16 showed GFR more than 60.  3. Continue Eliquis 5 mg BID.  Recent blood work showed normal renal function.  4. Increase Zocor to 20 mg qHs.  Target LDL less than 70.    Follow-up in 3-4 weeks.    Thank you for allowing me to participate in taking care of patient.    Sandy Shepard. MD.      Shen Granger M.D.  [address] [facility] [address]  VIA In Basket

## 2017-02-14 NOTE — PROGRESS NOTES
Subjective:   Sherwin Hennessy is a 76 y.o. male with known history of coronary atherosclerosis, paroxysmal atrial fibrillation on sotalol and also on anticoagulation with Eliquis, hypertension, dyslipidemia presenting today for follow-up evaluation of atrial fibrillation.    Since sotalol dose was increased to 120 mg BID, patient denied any further breakthrough episodes of atrial fibrillation. No bleeding issues while on antibiotics. No complaints of exertional chest pain, pressure or tightness. Denied any complaints of dizziness, lightheadedness or LOC.    Past Medical History   Diagnosis Date   • History of chickenpox    • Heart disease    • History of measles    • History of mumps    • Hypertension    • Heartburn    • Arthritis    • Snoring    • Hiatal hernia      Past Surgical History   Procedure Laterality Date   • Vasectomy     • Inguinal hernia repair     • Eye surgery Bilateral      cataract sx     Family History   Problem Relation Age of Onset   • Hypertension Mother    • Heart Disease Father    • Cancer Sister    • Cancer Brother    • Arthritis Other    • Diabetes Other      History   Smoking status   • Never Smoker    Smokeless tobacco   • Never Used     Allergies   Allergen Reactions   • Amiodarone Unspecified     Felt like he was going to die   • Food      Watermelon, cantaloupe, strawberries, apples     Outpatient Encounter Prescriptions as of 2/14/2017   Medication Sig Dispense Refill   • gabapentin (NEURONTIN) 300 MG Cap Take 1 Cap by mouth every day. 90 Cap 1   • atorvastatin (LIPITOR) 20 MG Tab Take 1 Tab by mouth every day. 90 Tab 3   • sotalol (BETAPACE) 120 MG tablet Take 1 Tab by mouth 2 times a day. 60 Tab 11   • hydrochlorothiazide (HYDRODIURIL) 12.5 MG tablet Take 12.5 mg by mouth every day.     • tramadol (ULTRAM) 50 MG Tab Take 1 Tab by mouth 2 Times a Day. 180 Tab 0   • terazosin (HYTRIN) 5 MG Cap Take 1 Cap by mouth every day. 90 Cap 1   • predniSONE (DELTASONE) 5 MG Tab Take 1 Tab by  mouth every day. 30 Tab 0   • apixaban (ELIQUIS) 5mg Tab Take 1 Tab by mouth 2 Times a Day. 180 Tab 1   • cephALEXin (KEFLEX) 500 MG Cap Take 1 Cap by mouth 4 times a day. 28 Cap 0   • omeprazole (PRILOSEC) 20 MG delayed-release capsule Take 1 Cap by mouth every day. 90 Cap 1   • Cyanocobalamin (VITAMIN B-12 PO) Take 2,000 mcg by mouth every day.       No facility-administered encounter medications on file as of 2/14/2017.     Review of Systems   Constitutional: Negative for fever and chills.   HENT: Positive for hearing loss. Negative for tinnitus.    Eyes: Negative for blurred vision and double vision.   Respiratory: Negative for shortness of breath.    Cardiovascular: Negative for chest pain, palpitations, orthopnea, claudication, leg swelling and PND.   Gastrointestinal: Negative for abdominal pain and blood in stool.   Genitourinary: Negative for dysuria and hematuria.   Musculoskeletal: Negative for myalgias and joint pain.   Skin: Negative for rash.   Neurological: Negative for dizziness, seizures and headaches.   Psychiatric/Behavioral: Negative for depression. The patient is not nervous/anxious and does not have insomnia.         Objective:   /60 mmHg  Pulse 62  Ht 1.829 m (6')  Wt 87.091 kg (192 lb)  BMI 26.03 kg/m2  SpO2 94%    Physical Exam   Constitutional: He is oriented to person, place, and time. He appears well-developed and well-nourished. No distress.   HENT:   Head: Normocephalic and atraumatic.   Mouth/Throat: No oropharyngeal exudate.   Eyes: Pupils are equal, round, and reactive to light. Right eye exhibits no discharge. Left eye exhibits no discharge.   Neck: No JVD present. No tracheal deviation present.   Cardiovascular: Regular rhythm.  Bradycardia present.    No murmur heard.  Pulmonary/Chest: Effort normal and breath sounds normal. No respiratory distress. He has no wheezes. He has no rales.   Abdominal: Soft. Bowel sounds are normal. He exhibits no distension. There is no  tenderness. There is no rebound.   Musculoskeletal: Normal range of motion. He exhibits no edema.   Neurological: He is alert and oriented to person, place, and time. No cranial nerve deficit.   Skin: Skin is warm and dry. He is not diaphoretic. No erythema.   Psychiatric: He has a normal mood and affect.      EK17  EKG personally reviewed by me.  Sinus bradycardia 55 bpm normal axis  ms.    EK17  EKG personally reviewed by me.  Sinus rhythm 62 bpm normal axis QTc interval of 451 ms.    CT chest: 12/15/16  1.  No evidence for parenchymal lung mass. The abnormality seen on chest x-ray likely represented artifact.  2.  Status post cholecystectomy with pneumobilia likely indicative of prior sphincterotomy.  3.  Coronary atherosclerosis    EK16  EKG personally reviewed by me.  Sinus rhythm 65 bpm normal axis no significant ST or T-wave changes.    Results for GUILLE UP (MRN [id number]) as of 2017 15:15   2016    Sodium 143    Potassium 4.1    Chloride 107    Co2 29    Anion Gap 11    Glucose 109 (H)    Bun 16    Creatinine 1.1    GFR If Non African American >60    Calcium 9.0    AST(SGOT) 23    ALT(SGPT) 23    Alkaline Phosphatase 80    Cholesterol,Tot 149 114 (L)   Triglycerides 139 66   HDL 42.0 43.0   Non HDL Cholesterol 107 71   LDL 79 58   Chol-Hdl Ratio 3.55 2.65     Assessment:     1. Coronary atherosclerosis  2. Paroxysmal atrial fibrillation  3. Chronic anticoagulation  4. Dyslipidemia    Medical Decision Making:  Today's Assessment / Status / Plan:     1. Patient had a nuclear stress test approximately 3 years ago in [place] according to patient stress test was negative for ischemia.  2. Continue sotalol 120 mg BID.  EKG from today showed QTc interval of 433 ms.   Labs from showed GFR more than 60.  3. Continue Eliquis 5 mg BID.  Recent blood work showed normal renal function.  4. Continue Lipitor 20 mg qHs.  LDL less than 70.    Follow-up in 1  year.  EKG in 6 months.  Labs prior to next visit.    Thank you for allowing me to participate in taking care of patient.    Sandy Gentile MD.

## 2017-02-14 NOTE — MR AVS SNAPSHOT
Sherwin Hennessy   2017 3:00 PM   Office Visit   MRN: [id number]    Department[facility]   Dept Phone:  [phone]    Description:  Male : 1940   Provider:  Sandy Shepard M.D.           Reason for Visit     Follow-Up           Allergies as of 2017     Allergen Noted Reactions    Amiodarone 2016   Unspecified    [place] like he was going to die    Food 2016       Watermelon, cantaloupe, strawberries, apples      You were diagnosed with     Paroxysmal atrial fibrillation (CMS-HCC)   [505754]         Vital Signs     Blood Pressure Pulse Height Weight Body Mass Index Oxygen Saturation    130/60 mmHg 62 1.829 m (6') 87.091 kg (192 lb) 26.03 kg/m2 94%    Smoking Status                   Never Smoker            Basic Information     Date Of Birth Sex Race Ethnicity Preferred Language    1940 Male White Non- English      Your appointments     2017  9:30 AM   Echo with ECHO CV   ECHO [facility] (--)    [address]  [facility] [address]   [phone]            2017  8:30 AM   15 Minute with Shen Granger M.D.   [facility] (--)    [address]  [facility] [id number] [phone]              Problem List              ICD-10-CM Priority Class Noted - Resolved    Esophageal reflux K21.9   2016 - Present    Atrial fibrillation (CMS-HCC) I48.91   2016 - Present    BPH (benign prostatic hypertrophy) N40.0   2016 - Present    Hyperlipidemia E78.5   2016 - Present    Peripheral neuropathy (CMS-HCC) G62.9   2016 - Present    Elevated PSA R97.20   2016 - Present    Hiatal hernia K44.9   2016 - Present      Health Maintenance        Date Due Completion Dates    IMM DTaP/Tdap/Td Vaccine (1 - Tdap) 2010    COLONOSCOPY 2025 (Done)    Override on 2015: Done (per NP papers)            Results       Current Immunizations     13-VALENT PCV PREVNAR 3/30/2016    Influenza TIV (IM) 5/1/2015    Influenza Vaccine Adult HD 10/25/2016  9:45 AM    Pneumococcal polysaccharide vaccine (PPSV-23) 1/1/2015    SHINGLES VACCINE 1/1/2015    TD Vaccine 1/1/2010      Below and/or attached are the medications your provider expects you to take. Review all of your home medications and newly ordered medications with your provider and/or pharmacist. Follow medication instructions as directed by your provider and/or pharmacist. Please keep your medication list with you and share with your provider. Update the information when medications are discontinued, doses are changed, or new medications (including over-the-counter products) are added; and carry medication information at all times in the event of emergency situations     Allergies:  AMIODARONE - Unspecified     FOOD - (reactions not documented)               Medications  Valid as of: February 14, 2017 -  3:31 PM    Generic Name Brand Name Tablet Size Instructions for use    Apixaban (Tab) ELIQUIS 5mg Take 1 Tab by mouth 2 Times a Day.        Atorvastatin Calcium (Tab) LIPITOR 20 MG Take 1 Tab by mouth every day.        Cephalexin (Cap) KEFLEX 500 MG Take 1 Cap by mouth 4 times a day.        Cyanocobalamin   Take 2,000 mcg by mouth every day.        Gabapentin (Cap) NEURONTIN 300 MG Take 1 Cap by mouth every day.        HydroCHLOROthiazide (Tab) HYDRODIURIL 12.5 MG Take 12.5 mg by mouth every day.        Omeprazole (CAPSULE DELAYED RELEASE) PRILOSEC 20 MG Take 1 Cap by mouth every day.        PredniSONE (Tab) DELTASONE 5 MG Take 1 Tab by mouth every day.        Sotalol HCl (Tab) BETAPACE 120 MG Take 1 Tab by mouth 2 times a day.        Terazosin HCl (Cap) HYTRIN 5 MG Take 1 Cap by mouth every day.        TraMADol HCl (Tab) ULTRAM 50 MG Take 1 Tab by mouth 2 Times a Day.        .                 Medicines prescribed today were sent to:     [facility] #[id number] - [place], [place] - [address]    [address]  [facility] [address]     Phone: [phone] Fax: [phone]    Open 24 Hours?: No      Medication refill instructions:       If your prescription bottle indicates you have medication refills left, it is not necessary to call your provider’s office. Please contact your pharmacy and they will refill your medication.    If your prescription bottle indicates you do not have any refills left, you may request refills at any time through one of the following ways: The online vSocial system (except Urgent Care), by calling your provider’s office, or by asking your pharmacy to contact your provider’s office with a refill request. Medication refills are processed only during regular business hours and may not be available until the next business day. Your provider may request additional information or to have a follow-up visit with you prior to refilling your medication.   *Please Note: Medication refills are assigned a new Rx number when refilled electronically. Your pharmacy may indicate that no refills were authorized even though a new prescription for the same medication is available at the pharmacy. Please request the medicine by name with the pharmacy before contacting your provider for a refill.           vSocial Access Code: Activation code not generated  Current vSocial Status: Active

## 2017-07-11 ENCOUNTER — TELEPHONE (OUTPATIENT)
Dept: CARDIOLOGY | Facility: MEDICAL CENTER | Age: 77
End: 2017-07-11

## 2017-07-11 NOTE — TELEPHONE ENCOUNTER
"Spoke with patient and he states that since he has gone up on his sotalol his has been going \"in and out\" and when it does his HR goes up - mainly stays between  but has gone as high as 150 - states it did not stay that high for very long - he has made an appt with AM for next week and will go have CMP drawn for that appointment - already had Lipid panel done for PCP a couple weeks ago. - He will call back if anything else comes up between now and his appt.   "

## 2017-07-11 NOTE — TELEPHONE ENCOUNTER
----- Message from Kelsey Barcenas, Med Ass't sent at 7/11/2017 12:26 PM PDT -----  Regarding: NOT FEELING WELL  Per pt he feels heart is out of sync, does not want to se Dr Julian, does not wan to go to [place] for an appt with Devyn, set him an appt with Dr Shepard on the 20 th but still wants a call back.    Please call pt     Thank you    Ellen

## 2017-07-20 ENCOUNTER — OFFICE VISIT (OUTPATIENT)
Dept: CARDIOLOGY | Facility: CLINIC | Age: 77
End: 2017-07-20
Payer: MEDICARE

## 2017-07-20 VITALS
HEIGHT: 72 IN | DIASTOLIC BLOOD PRESSURE: 62 MMHG | HEART RATE: 65 BPM | SYSTOLIC BLOOD PRESSURE: 150 MMHG | OXYGEN SATURATION: 96 % | BODY MASS INDEX: 25.6 KG/M2 | WEIGHT: 189 LBS

## 2017-07-20 DIAGNOSIS — I48.91 ATRIAL FIBRILLATION, UNSPECIFIED TYPE (HCC): ICD-10-CM

## 2017-07-20 DIAGNOSIS — E78.2 MIXED HYPERLIPIDEMIA: ICD-10-CM

## 2017-07-20 DIAGNOSIS — Z79.01 CHRONIC ANTICOAGULATION: ICD-10-CM

## 2017-07-20 DIAGNOSIS — Z79.899 HIGH RISK MEDICATION USE: ICD-10-CM

## 2017-07-20 PROCEDURE — 99214 OFFICE O/P EST MOD 30 MIN: CPT | Performed by: INTERNAL MEDICINE

## 2017-07-20 ASSESSMENT — ENCOUNTER SYMPTOMS
BLURRED VISION: 0
ORTHOPNEA: 0
MYALGIAS: 0
SEIZURES: 0
DOUBLE VISION: 0
ABDOMINAL PAIN: 0
NERVOUS/ANXIOUS: 0
HEADACHES: 0
BLOOD IN STOOL: 0
CHILLS: 0
DIZZINESS: 0
PALPITATIONS: 1
CLAUDICATION: 0
INSOMNIA: 0
PND: 0
FEVER: 0
SHORTNESS OF BREATH: 0
DEPRESSION: 0

## 2017-07-20 NOTE — MR AVS SNAPSHOT
"        Sherwin Hennessy   2017 1:20 PM   Office Visit   MRN: [id number]    Department:  [facility]   Dept Phone:  [phone]    Description:  Male : 1940   Provider:  Sandy Shepard M.D.           Reason for Visit     Follow-Up           Allergies as of 2017     Allergen Noted Reactions    Amiodarone 2016   Unspecified    [place] like he was going to die    Food 2016       Watermelon, cantaloupe, strawberries, apples      You were diagnosed with     Atrial fibrillation, unspecified type (CMS-HCC)   [3482268]       Mixed hyperlipidemia   [272.2.ICD-9-CM]       High risk medication use   [143300]       Chronic anticoagulation   [510570]         Vital Signs     Blood Pressure Pulse Height Weight Body Mass Index Oxygen Saturation    150/62 mmHg 65 1.829 m (6' 0.01\") 85.73 kg (189 lb) 25.63 kg/m2 96%    Smoking Status                   Never Smoker            Basic Information     Date Of Birth Sex Race Ethnicity Preferred Language    1940 Male White Non- English      Your appointments     Aug 16, 2017 11:20 AM   PREVIOUS PATIENT with Cristian Guardado M.D.   [facility] ([facility]    [address], [facility] [address]  [facility] [id number] [phone]            2018  8:40 AM   [facility]    [address] [address]  [address]  [facility] [id number] [phone]              Problem List              ICD-10-CM Priority Class Noted - Resolved    Esophageal reflux K21.9   2016 - Present    Atrial fibrillation (CMS-HCC) I48.91   2016 - Present    BPH (benign prostatic hypertrophy) N40.0   2016 - Present    Hyperlipidemia E78.5   2016 - Present    Peripheral neuropathy G62.9   2016 - Present    Elevated PSA R97.20   2016 - Present    Hiatal hernia K44.9   2016 - Present      Health Maintenance        Date Due Completion " Dates    IMM DTaP/Tdap/Td Vaccine (1 - Tdap) 1/2/2010 1/1/2010    IMM INFLUENZA (1) 9/1/2017 10/25/2016, 5/1/2015    COLONOSCOPY 1/1/2025 1/1/2015 (Done)    Override on 1/1/2015: Done (per NP papers)            Current Immunizations     13-VALENT PCV PREVNAR 3/30/2016    Influenza TIV (IM) 5/1/2015    Influenza Vaccine Adult HD 10/25/2016  9:45 AM    Pneumococcal polysaccharide vaccine (PPSV-23) 1/1/2015    SHINGLES VACCINE 1/1/2015    TD Vaccine 1/1/2010      Below and/or attached are the medications your provider expects you to take. Review all of your home medications and newly ordered medications with your provider and/or pharmacist. Follow medication instructions as directed by your provider and/or pharmacist. Please keep your medication list with you and share with your provider. Update the information when medications are discontinued, doses are changed, or new medications (including over-the-counter products) are added; and carry medication information at all times in the event of emergency situations     Allergies:  AMIODARONE - Unspecified     FOOD - (reactions not documented)               Medications  Valid as of: July 20, 2017 -  1:35 PM    Generic Name Brand Name Tablet Size Instructions for use    Apixaban (Tab) ELIQUIS 5mg Take 1 Tab by mouth 2 Times a Day.        Aspirin (Tab) aspirin 81 MG Take 81 mg by mouth every day.        Atorvastatin Calcium (Tab) LIPITOR 20 MG Take 1 Tab by mouth every day.        Cephalexin (Cap) KEFLEX 500 MG Take 1 Cap by mouth 4 times a day.        Cyanocobalamin   Take 2,000 mcg by mouth every day.        Gabapentin (Cap) NEURONTIN 300 MG Take 1 Cap by mouth every day.        HydroCHLOROthiazide (Tab) HYDRODIURIL 12.5 MG Take 1 Tab by mouth every day.        Omeprazole (CAPSULE DELAYED RELEASE) PRILOSEC 20 MG Take 1 Cap by mouth every day.        PredniSONE (Tab) DELTASONE 5 MG Take 1 Tab by mouth every day.        Sotalol HCl (Tab) BETAPACE 120 MG Take 1 Tab by mouth 2  times a day.        Terazosin HCl (Cap) HYTRIN 5 MG Take 1 Cap by mouth every day.        TraMADol HCl (Tab) ULTRAM 50 MG Take 1 Tab by mouth 2 Times a Day.        .                 Medicines prescribed today were sent to:     [facility][id number] - [facility] [place] - [address]    [address]  [place] [address]    Phone: [phone] Fax: [phone]    Open 24 Hours?: No      Medication refill instructions:       If your prescription bottle indicates you have medication refills left, it is not necessary to call your provider’s office. Please contact your pharmacy and they will refill your medication.    If your prescription bottle indicates you do not have any refills left, you may request refills at any time through one of the following ways: The online Wi[employer] MS system (except Urgent Care), by calling your provider’s office, or by asking your pharmacy to contact your provider’s office with a refill request. Medication refills are processed only during regular business hours and may not be available until the next business day. Your provider may request additional information or to have a follow-up visit with you prior to refilling your medication.   *Please Note: Medication refills are assigned a new Rx number when refilled electronically. Your pharmacy may indicate that no refills were authorized even though a new prescription for the same medication is available at the pharmacy. Please request the medicine by name with the pharmacy before contacting your provider for a refill.        Your To Do List     Future Labs/Procedures Complete By Expires    COMP METABOLIC PANEL  As directed 7/20/2018      Referral     A referral request has been sent to our patient care coordination department. Please allow 3-5 business days for us to process this request and contact you either by phone or mail. If you do not hear from us by the 5th business day, please call us at [phone].           WiN MS Access  Code: Activation code not generated  Current MyChart Status: Active

## 2017-07-20 NOTE — PROGRESS NOTES
Subjective:   Sherwin Hennessy is a 76 y.o. male with known history of coronary atherosclerosis, paroxysmal atrial fibrillation on sotalol and also on anticoagulation with Eliquis, hypertension, dyslipidemia presenting today for follow-up evaluation of atrial fibrillation.    Patient still having breakthrough episodes of atrial fibrillation once every 3 months in spite of being on sotalol 120 mg BID. No bleeding episodes while on anticoagulation with  Eliquis. No complaints of exertional chest pain pressure or tightness. Denied any complaints of dizziness lightheadedness or LOC. No complaints of lower extremity edema or claudication.       Past Medical History   Diagnosis Date   • Hiatal hernia    • PAF (paroxysmal atrial fibrillation) (CMS-HCC)    • CAD (coronary artery disease)    • Neuropathy (CMS-HCC)    • Elevated PSA      Past Surgical History   Procedure Laterality Date   • Vasectomy     • Inguinal hernia repair     • Eye surgery Bilateral      cataract sx     Family History   Problem Relation Age of Onset   • Hypertension Mother    • Heart Disease Father    • Cancer Sister    • Cancer Brother    • Arthritis Other    • Diabetes Other      History   Smoking status   • Never Smoker    Smokeless tobacco   • Never Used     Allergies   Allergen Reactions   • Amiodarone Unspecified     Felt like he was going to die   • Food      Watermelon, cantaloupe, strawberries, apples     Outpatient Encounter Prescriptions as of 7/20/2017   Medication Sig Dispense Refill   • aspirin 81 MG tablet Take 81 mg by mouth every day.     • hydrochlorothiazide (HYDRODIURIL) 12.5 MG tablet Take 1 Tab by mouth every day. 90 Tab 3   • terazosin (HYTRIN) 5 MG Cap Take 1 Cap by mouth every day. 90 Cap 3   • tramadol (ULTRAM) 50 MG Tab Take 1 Tab by mouth 2 Times a Day. 180 Tab 0   • apixaban (ELIQUIS) 5mg Tab Take 1 Tab by mouth 2 Times a Day. 180 Tab 3   • gabapentin (NEURONTIN) 300 MG Cap Take 1 Cap by mouth every day. 90 Cap 1   •  "atorvastatin (LIPITOR) 20 MG Tab Take 1 Tab by mouth every day. 90 Tab 3   • sotalol (BETAPACE) 120 MG tablet Take 1 Tab by mouth 2 times a day. 60 Tab 11   • predniSONE (DELTASONE) 5 MG Tab Take 1 Tab by mouth every day. 30 Tab 0   • cephALEXin (KEFLEX) 500 MG Cap Take 1 Cap by mouth 4 times a day. 28 Cap 0   • omeprazole (PRILOSEC) 20 MG delayed-release capsule Take 1 Cap by mouth every day. 90 Cap 1   • Cyanocobalamin (VITAMIN B-12 PO) Take 2,000 mcg by mouth every day.       No facility-administered encounter medications on file as of 7/20/2017.     Review of Systems   Constitutional: Negative for fever and chills.   HENT: Positive for hearing loss. Negative for tinnitus.    Eyes: Negative for blurred vision and double vision.   Respiratory: Negative for shortness of breath.    Cardiovascular: Positive for palpitations. Negative for chest pain, orthopnea, claudication, leg swelling and PND.   Gastrointestinal: Negative for abdominal pain and blood in stool.   Genitourinary: Negative for dysuria and hematuria.   Musculoskeletal: Negative for myalgias and joint pain.   Skin: Negative for rash.   Neurological: Negative for dizziness, seizures and headaches.   Psychiatric/Behavioral: Negative for depression. The patient is not nervous/anxious and does not have insomnia.         Objective:   /62 mmHg  Pulse 65  Ht 1.829 m (6' 0.01\")  Wt 85.73 kg (189 lb)  BMI 25.63 kg/m2  SpO2 96%    Physical Exam   Constitutional: He is oriented to person, place, and time. He appears well-developed and well-nourished. No distress.   HENT:   Head: Normocephalic and atraumatic.   Mouth/Throat: No oropharyngeal exudate.   Eyes: Pupils are equal, round, and reactive to light. Right eye exhibits no discharge. Left eye exhibits no discharge.   Neck: No JVD present. No tracheal deviation present.   Cardiovascular: Regular rhythm.  Bradycardia present.    No murmur heard.  Pulmonary/Chest: Effort normal and breath sounds normal. " No respiratory distress. He has no wheezes. He has no rales.   Abdominal: Soft. Bowel sounds are normal. He exhibits no distension. There is no tenderness. There is no rebound.   Musculoskeletal: Normal range of motion. He exhibits no edema.   Neurological: He is alert and oriented to person, place, and time. No cranial nerve deficit.   Skin: Skin is warm and dry. He is not diaphoretic. No erythema.   Psychiatric: He has a normal mood and affect.    Results for GUILLE UP (MRN [id number]) as of 2017 13:46   2017    Cholesterol,Tot 114 (L) 112 (L)   Triglycerides 66 106   HDL 43.0 35.0 (L)   Non HDL Cholesterol 71 77   LDL 58 56     EK17  EKG personally reviewed by me.  Sinus bradycardia 55 bpm normal axis  ms.    EK17  EKG personally reviewed by me.  Sinus rhythm 62 bpm normal axis QTc interval of 451 ms.    CT chest: 12/15/16  1.  No evidence for parenchymal lung mass. The abnormality seen on chest x-ray likely represented artifact.  2.  Status post cholecystectomy with pneumobilia likely indicative of prior sphincterotomy.  3.  Coronary atherosclerosis    EK16  EKG personally reviewed by me.  Sinus rhythm 65 bpm normal axis no significant ST or T-wave changes.    Results for GUILLE UP (MRN [id number]) as of 2017 15:15   2016    Sodium 143    Potassium 4.1    Chloride 107    Co2 29    Anion Gap 11    Glucose 109 (H)    Bun 16    Creatinine 1.1    GFR If Non African American >60    Calcium 9.0    AST(SGOT) 23    ALT(SGPT) 23    Alkaline Phosphatase 80    Cholesterol,Tot 149 114 (L)   Triglycerides 139 66   HDL 42.0 43.0   Non HDL Cholesterol 107 71   LDL 79 58   Chol-Hdl Ratio 3.55 2.65     Assessment:     1. Coronary atherosclerosis  2. Paroxysmal atrial fibrillation  3. Chronic anticoagulation  4. Dyslipidemia    Medical Decision Making:  Today's Assessment / Status / Plan:     1. Patient had a nuclear stress test approximately 3 years  ago in [place] according to patient stress test was negative for ischemia.  Continue statins  2. Patient is still having breakthrough episodes of atrial fibrillation on sotalol 120 mg BID.  Referral to see EP for management of antiarrhythmics.  EKG from today showed QTc interval of 469 ms.   Check labs now  3. Continue Eliquis 5 mg BID.   No bleeding issues while on anticoagulation with a glucose  4. Continue Lipitor 20 mg qHs.  LDL less than 70.    Follow-up in 6 months  Referral to see EP.  Check CMP now.    Thank you for allowing me to participate in taking care of patient.    Sandy Gentile MD.

## 2017-07-20 NOTE — Clinical Note
[facility] [address],   [address]nd [place] [address]  Phone: [phone]  Fax: [phone]              Sherwin Hennessy  1940    Encounter Date: 7/20/2017    Sandy Shepard M.D.          PROGRESS NOTE:  Subjective:   Sherwin Hennessy is a 76 y.o. male with known history of coronary atherosclerosis, paroxysmal atrial fibrillation on sotalol and also on anticoagulation with Eliquis, hypertension, dyslipidemia presenting today for follow-up evaluation of atrial fibrillation.    Patient still having breakthrough episodes of atrial fibrillation once every 3 months in spite of being on sotalol 120 mg BID. No bleeding episodes while on anticoagulation with  Eliquis. No complaints of exertional chest pain pressure or tightness. Denied any complaints of dizziness lightheadedness or LOC. No complaints of lower extremity edema or claudication.       Past Medical History   Diagnosis Date   • Hiatal hernia    • PAF (paroxysmal atrial fibrillation) (CMS-HCC)    • CAD (coronary artery disease)    • Neuropathy (CMS-HCC)    • Elevated PSA      Past Surgical History   Procedure Laterality Date   • Vasectomy     • Inguinal hernia repair     • Eye surgery Bilateral      cataract sx     Family History   Problem Relation Age of Onset   • Hypertension Mother    • Heart Disease Father    • Cancer Sister    • Cancer Brother    • Arthritis Other    • Diabetes Other      History   Smoking status   • Never Smoker    Smokeless tobacco   • Never Used     Allergies   Allergen Reactions   • Amiodarone Unspecified     Felt like he was going to die   • Food      Watermelon, cantaloupe, strawberries, apples     Outpatient Encounter Prescriptions as of 7/20/2017   Medication Sig Dispense Refill   • aspirin 81 MG tablet Take 81 mg by mouth every day.     • hydrochlorothiazide (HYDRODIURIL) 12.5 MG tablet Take 1 Tab by mouth every day. 90 Tab 3   • terazosin (HYTRIN) 5 MG  "Cap Take 1 Cap by mouth every day. 90 Cap 3   • tramadol (ULTRAM) 50 MG Tab Take 1 Tab by mouth 2 Times a Day. 180 Tab 0   • apixaban (ELIQUIS) 5mg Tab Take 1 Tab by mouth 2 Times a Day. 180 Tab 3   • gabapentin (NEURONTIN) 300 MG Cap Take 1 Cap by mouth every day. 90 Cap 1   • atorvastatin (LIPITOR) 20 MG Tab Take 1 Tab by mouth every day. 90 Tab 3   • sotalol (BETAPACE) 120 MG tablet Take 1 Tab by mouth 2 times a day. 60 Tab 11   • predniSONE (DELTASONE) 5 MG Tab Take 1 Tab by mouth every day. 30 Tab 0   • cephALEXin (KEFLEX) 500 MG Cap Take 1 Cap by mouth 4 times a day. 28 Cap 0   • omeprazole (PRILOSEC) 20 MG delayed-release capsule Take 1 Cap by mouth every day. 90 Cap 1   • Cyanocobalamin (VITAMIN B-12 PO) Take 2,000 mcg by mouth every day.       No facility-administered encounter medications on file as of 7/20/2017.     Review of Systems   Constitutional: Negative for fever and chills.   HENT: Positive for hearing loss. Negative for tinnitus.    Eyes: Negative for blurred vision and double vision.   Respiratory: Negative for shortness of breath.    Cardiovascular: Positive for palpitations. Negative for chest pain, orthopnea, claudication, leg swelling and PND.   Gastrointestinal: Negative for abdominal pain and blood in stool.   Genitourinary: Negative for dysuria and hematuria.   Musculoskeletal: Negative for myalgias and joint pain.   Skin: Negative for rash.   Neurological: Negative for dizziness, seizures and headaches.   Psychiatric/Behavioral: Negative for depression. The patient is not nervous/anxious and does not have insomnia.         Objective:   /62 mmHg  Pulse 65  Ht 1.829 m (6' 0.01\")  Wt 85.73 kg (189 lb)  BMI 25.63 kg/m2  SpO2 96%    Physical Exam   Constitutional: He is oriented to person, place, and time. He appears well-developed and well-nourished. No distress.   HENT:   Head: Normocephalic and atraumatic.   Mouth/Throat: No oropharyngeal exudate.   Eyes: Pupils are equal, " round, and reactive to light. Right eye exhibits no discharge. Left eye exhibits no discharge.   Neck: No JVD present. No tracheal deviation present.   Cardiovascular: Regular rhythm.  Bradycardia present.    No murmur heard.  Pulmonary/Chest: Effort normal and breath sounds normal. No respiratory distress. He has no wheezes. He has no rales.   Abdominal: Soft. Bowel sounds are normal. He exhibits no distension. There is no tenderness. There is no rebound.   Musculoskeletal: Normal range of motion. He exhibits no edema.   Neurological: He is alert and oriented to person, place, and time. No cranial nerve deficit.   Skin: Skin is warm and dry. He is not diaphoretic. No erythema.   Psychiatric: He has a normal mood and affect.    Results for GUILLE UP (MRN [id number]) as of 2017 13:46   2017    Cholesterol,Tot 114 (L) 112 (L)   Triglycerides 66 106   HDL 43.0 35.0 (L)   Non HDL Cholesterol 71 77   LDL 58 56     EK17  EKG personally reviewed by me.  Sinus bradycardia 55 bpm normal axis  ms.    EK17  EKG personally reviewed by me.  Sinus rhythm 62 bpm normal axis QTc interval of 451 ms.    CT chest: 12/15/16  1.  No evidence for parenchymal lung mass. The abnormality seen on chest x-ray likely represented artifact.  2.  Status post cholecystectomy with pneumobilia likely indicative of prior sphincterotomy.  3.  Coronary atherosclerosis    EK16  EKG personally reviewed by me.  Sinus rhythm 65 bpm normal axis no significant ST or T-wave changes.    Results for GUILLE UP (MRN [id number]) as of 2017 15:15   2016    Sodium 143    Potassium 4.1    Chloride 107    Co2 29    Anion Gap 11    Glucose 109 (H)    Bun 16    Creatinine 1.1    GFR If Non African American >60    Calcium 9.0    AST(SGOT) 23    ALT(SGPT) 23    Alkaline Phosphatase 80    Cholesterol,Tot 149 114 (L)   Triglycerides 139 66   HDL 42.0 43.0   Non HDL Cholesterol 107 71   LDL 79 58     Chol-Hdl Ratio 3.55 2.65     Assessment:     1. Coronary atherosclerosis  2. Paroxysmal atrial fibrillation  3. Chronic anticoagulation  4. Dyslipidemia    Medical Decision Making:  Today's Assessment / Status / Plan:     1. Patient had a nuclear stress test approximately 3 years ago in [place] according to patient stress test was negative for ischemia.  Continue statins  2. Patient is still having breakthrough episodes of atrial fibrillation on sotalol 120 mg BID.  Referral to see EP for management of antiarrhythmics.  EKG from today showed QTc interval of 469 ms.   Check labs now  3. Continue Eliquis 5 mg BID.   No bleeding issues while on anticoagulation with a glucose  4. Continue Lipitor 20 mg qHs.  LDL less than 70.    Follow-up in 6 months  Referral to see EP.  Check CMP now.    Thank you for allowing me to participate in taking care of patient.    Sandy Gentile MD.      Shen Granger M.D.  [address] [facility] [address]  VIA In Basket

## 2017-07-23 LAB — EKG IMPRESSION: NORMAL

## 2017-08-16 ENCOUNTER — OFFICE VISIT (OUTPATIENT)
Dept: CARDIOLOGY | Facility: MEDICAL CENTER | Age: 77
End: 2017-08-16
Payer: MEDICARE

## 2017-08-16 VITALS
WEIGHT: 188 LBS | HEIGHT: 72 IN | OXYGEN SATURATION: 92 % | HEART RATE: 71 BPM | SYSTOLIC BLOOD PRESSURE: 118 MMHG | BODY MASS INDEX: 25.47 KG/M2 | DIASTOLIC BLOOD PRESSURE: 76 MMHG

## 2017-08-16 DIAGNOSIS — Z51.81 ENCOUNTER FOR MONITORING SOTALOL THERAPY: ICD-10-CM

## 2017-08-16 DIAGNOSIS — Z79.01 ANTICOAGULANT LONG-TERM USE: ICD-10-CM

## 2017-08-16 DIAGNOSIS — I48.91 ATRIAL FIBRILLATION, UNSPECIFIED TYPE (HCC): ICD-10-CM

## 2017-08-16 DIAGNOSIS — Z79.899 ENCOUNTER FOR MONITORING SOTALOL THERAPY: ICD-10-CM

## 2017-08-16 LAB — EKG IMPRESSION: NORMAL

## 2017-08-16 PROCEDURE — 93000 ELECTROCARDIOGRAM COMPLETE: CPT | Performed by: INTERNAL MEDICINE

## 2017-08-16 PROCEDURE — 99204 OFFICE O/P NEW MOD 45 MIN: CPT | Performed by: INTERNAL MEDICINE

## 2017-08-16 ASSESSMENT — ENCOUNTER SYMPTOMS
PND: 0
MYALGIAS: 0
COUGH: 0
ORTHOPNEA: 0
DIZZINESS: 0
HEADACHES: 0
NAUSEA: 0
CHILLS: 0
BRUISES/BLEEDS EASILY: 0
LOSS OF CONSCIOUSNESS: 0
FEVER: 0
ABDOMINAL PAIN: 0
SHORTNESS OF BREATH: 0
PALPITATIONS: 1

## 2017-08-16 NOTE — Clinical Note
[facility]   [address], [address]  [place] [address]  Phone: [phone]  Fax: [phone]              Sherwin Hennessy  1940    Encounter Date: 8/16/2017    Cristian Guardado M.D.          PROGRESS NOTE:  Subjective:   Sherwin Hennessy is a 77 y.o. male who presents today in consult on request of Dr Shepard secondary to recurrent PAF with self conversion. First diagnosed a few years back in [place]. Tried amio with side effects and subsequently placed on sotalol. Previously 80 BID and now 120 BID. Still gets breakthroughs every few months which self terminate after a few days. Feels mildly weak with them. On eliquis without problems.Cr ok. Never tested for sleep apnea and does not want testing.    Past Medical History   Diagnosis Date   • Hiatal hernia    • PAF (paroxysmal atrial fibrillation) (CMS-HCC)    • CAD (coronary artery disease)    • Neuropathy (CMS-HCC)    • Elevated PSA      Past Surgical History   Procedure Laterality Date   • Vasectomy     • Inguinal hernia repair     • Eye surgery Bilateral      cataract sx     Family History   Problem Relation Age of Onset   • Hypertension Mother    • Heart Disease Father    • Cancer Sister    • Cancer Brother    • Arthritis Other    • Diabetes Other      History   Smoking status   • Never Smoker    Smokeless tobacco   • Never Used     Allergies   Allergen Reactions   • Amiodarone Unspecified     Felt like he was going to die   • Food      Watermelon, cantaloupe, strawberries, apples     Outpatient Encounter Prescriptions as of 8/16/2017   Medication Sig Dispense Refill   • gabapentin (NEURONTIN) 300 MG Cap Take 1 Cap by mouth every day. 90 Cap 1   • hydrochlorothiazide (HYDRODIURIL) 12.5 MG tablet Take 1 Tab by mouth every day. 90 Tab 3   • terazosin (HYTRIN) 5 MG Cap Take 1 Cap by mouth every day. 90 Cap 3   • tramadol (ULTRAM) 50 MG Tab Take 1 Tab by mouth 2 Times a Day. (Patient taking differently: Take 50 mg  "by mouth Once Every 12 Months.) 180 Tab 0   • apixaban (ELIQUIS) 5mg Tab Take 1 Tab by mouth 2 Times a Day. 180 Tab 3   • atorvastatin (LIPITOR) 20 MG Tab Take 1 Tab by mouth every day. 90 Tab 3   • sotalol (BETAPACE) 120 MG tablet Take 1 Tab by mouth 2 times a day. 60 Tab 11   • predniSONE (DELTASONE) 5 MG Tab Take 1 Tab by mouth every day. 30 Tab 0   • Cyanocobalamin (VITAMIN B-12 PO) Take 2,000 mcg by mouth every day.     • aspirin 81 MG tablet Take 81 mg by mouth every day.     • cephALEXin (KEFLEX) 500 MG Cap Take 1 Cap by mouth 4 times a day. 28 Cap 0   • omeprazole (PRILOSEC) 20 MG delayed-release capsule Take 1 Cap by mouth every day. 90 Cap 1     No facility-administered encounter medications on file as of 8/16/2017.     Review of Systems   Constitutional: Negative for fever and chills.   HENT: Negative for congestion.    Respiratory: Negative for cough and shortness of breath.    Cardiovascular: Positive for palpitations. Negative for chest pain, orthopnea, leg swelling and PND.   Gastrointestinal: Negative for nausea and abdominal pain.   Musculoskeletal: Negative for myalgias.   Skin: Negative for rash.   Neurological: Negative for dizziness, loss of consciousness and headaches.   Endo/Heme/Allergies: Does not bruise/bleed easily.        Objective:   /76 mmHg  Pulse 71  Ht 1.829 m (6' 0.01\")  Wt 85.276 kg (188 lb)  BMI 25.49 kg/m2  SpO2 92%    Physical Exam   Constitutional: He is oriented to person, place, and time. He appears well-developed and well-nourished.   HENT:   Mouth/Throat: Oropharynx is clear and moist.   Eyes: Conjunctivae and EOM are normal.   Neck: No JVD present. No thyroid mass present.   Cardiovascular: Normal rate, regular rhythm, S1 normal, S2 normal and normal pulses.  PMI is not displaced.  Exam reveals no gallop.    No murmur heard.  Pulses:       Carotid pulses are 2+ on the right side, and 2+ on the left side.       Radial pulses are 2+ on the right side, and 2+ on " the left side.        Femoral pulses are 2+ on the right side, and 2+ on the left side.       Dorsalis pedis pulses are 2+ on the right side, and 2+ on the left side.   No peripheral edema.   Pulmonary/Chest: Effort normal and breath sounds normal.   Abdominal: Soft. Normal appearance. He exhibits no abdominal bruit. There is no hepatosplenomegaly. There is no tenderness.   Musculoskeletal: Normal range of motion. He exhibits no edema.        Thoracic back: He exhibits no tenderness and no spasm.   Neurological: He is alert and oriented to person, place, and time.   Skin: No rash noted. No cyanosis. Nails show no clubbing.   Psychiatric: He has a normal mood and affect.       Assessment:     1. Atrial fibrillation, unspecified type (CMS-HCC)  EKG   2. Anticoagulant long-term use     3. Encounter for monitoring sotalol therapy         Medical Decision Making:  Today's Assessment / Status / Plan:     1. PAF on sotalol with QTC below 500 msec. Still with some breakthrough but not too troubling. If more problematic would recommend a PVI.  2. Anticoagulation continue.  3. Sleep apnea workup refuses.  4. F/U scheduled with Dr Shepard.      Shen Granger M.D.  [address] [facility] [address]  VIA In Basket     Sandy Shepard M.D.  [address] [address]  VIA In Basket

## 2017-08-16 NOTE — MR AVS SNAPSHOT
"        Sherwin Hennessy   2017 11:20 AM   Office Visit   MRN: [id number]    Department:  [facility]   Dept Phone:  [phone]    Description:  Male : 1940   Provider:  Cristian Guardado M.D.           Reason for Visit     Follow-Up           Allergies as of 2017     Allergen Noted Reactions    Amiodarone 2016   Unspecified    [place] like he was going to die    Food 2016       Watermelon, cantaloupe, strawberries, apples      You were diagnosed with     Atrial fibrillation, unspecified type (CMS-[facility])   [2288216]         Vital Signs     Blood Pressure Pulse Height Weight Body Mass Index Oxygen Saturation    118/76 mmHg 71 1.829 m (6' 0.01\") 85.276 kg (188 lb) 25.49 kg/m2 92%    Smoking Status                   Never Smoker            Basic Information     Date Of Birth Sex Race Ethnicity Preferred Language    1940 Male White Non- English      Your appointments     2018  8:40 AM   FOLLOW UP with Sandy Shepard M.D.   [facility]    [address] [place] [address]  [address] [facility] [address]   [phone]              Problem List              ICD-10-CM Priority Class Noted - Resolved    Esophageal reflux K21.9   2016 - Present    Atrial fibrillation (CMS-[facility]) I48.91   2016 - Present    BPH (benign prostatic hypertrophy) N40.0   2016 - Present    Hyperlipidemia E78.5   2016 - Present    Peripheral neuropathy G62.9   2016 - Present    Elevated PSA R97.20   2016 - Present    Hiatal hernia K44.9   2016 - Present      Health Maintenance        Date Due Completion Dates    IMM DTaP/Tdap/Td Vaccine (1 - Tdap) 2010    IMM INFLUENZA (1) 2017 10/25/2016, 2015    COLONOSCOPY 2025 (Done)    Override on 2015: Done (per NP papers)            Results       Current Immunizations     13-VALENT PCV PREVNAR 3/30/2016    Influenza TIV (IM) 2015    Influenza " Vaccine Adult HD 10/25/2016  9:45 AM    Pneumococcal polysaccharide vaccine (PPSV-23) 1/1/2015    SHINGLES VACCINE 1/1/2015    TD Vaccine 1/1/2010      Below and/or attached are the medications your provider expects you to take. Review all of your home medications and newly ordered medications with your provider and/or pharmacist. Follow medication instructions as directed by your provider and/or pharmacist. Please keep your medication list with you and share with your provider. Update the information when medications are discontinued, doses are changed, or new medications (including over-the-counter products) are added; and carry medication information at all times in the event of emergency situations     Allergies:  AMIODARONE - Unspecified     FOOD - (reactions not documented)               Medications  Valid as of: August 16, 2017 - 11:42 AM    Generic Name Brand Name Tablet Size Instructions for use    Apixaban (Tab) ELIQUIS 5mg Take 1 Tab by mouth 2 Times a Day.        Aspirin (Tab) aspirin 81 MG Take 81 mg by mouth every day.        Atorvastatin Calcium (Tab) LIPITOR 20 MG Take 1 Tab by mouth every day.        Cephalexin (Cap) KEFLEX 500 MG Take 1 Cap by mouth 4 times a day.        Cyanocobalamin   Take 2,000 mcg by mouth every day.        Gabapentin (Cap) NEURONTIN 300 MG Take 1 Cap by mouth every day.        HydroCHLOROthiazide (Tab) HYDRODIURIL 12.5 MG Take 1 Tab by mouth every day.        Omeprazole (CAPSULE DELAYED RELEASE) PRILOSEC 20 MG Take 1 Cap by mouth every day.        PredniSONE (Tab) DELTASONE 5 MG Take 1 Tab by mouth every day.        Sotalol HCl (Tab) BETAPACE 120 MG Take 1 Tab by mouth 2 times a day.        Terazosin HCl (Cap) HYTRIN 5 MG Take 1 Cap by mouth every day.        TraMADol HCl (Tab) ULTRAM 50 MG Take 1 Tab by mouth 2 Times a Day.        .                 Medicines prescribed today were sent to:     \[facility]\"" [facility] #[id number] - [place], [place] - [address]   [facility] [address]    Phone: [phone] Fax: [phone]    Open 24 Hours?: No      Medication refill instructions:       If your prescription bottle indicates you have medication refills left, it is not necessary to call your provider’s office. Please contact your pharmacy and they will refill your medication.    If your prescription bottle indicates you do not have any refills left, you may request refills at any time through one of the following ways: The online [facility] system (except Urgent Care), by calling your provider’s office, or by asking your pharmacy to contact your provider’s office with a refill request. Medication refills are processed only during regular business hours and may not be available until the next business day. Your provider may request additional information or to have a follow-up visit with you prior to refilling your medication.   *Please Note: Medication refills are assigned a new Rx number when refilled electronically. Your pharmacy may indicate that no refills were authorized even though a new prescription for the same medication is available at the pharmacy. Please request the medicine by name with the pharmacy before contacting your provider for a refill.           [facility] Access Code: Activation code not generated  Current [facility] Status: Active

## 2017-08-16 NOTE — PROGRESS NOTES
Subjective:   Sherwin Hennessy is a 77 y.o. male who presents today in consult on request of Dr Shepard secondary to recurrent PAF with self conversion. First diagnosed a few years back in [facility]. Tried amio with side effects and subsequently placed on sotalol. Previously 80 BID and now 120 BID. Still gets breakthroughs every few months which self terminate after a few days. Feels mildly weak with them. On eliquis without problems.Cr ok. Never tested for sleep apnea and does not want testing.    Past Medical History   Diagnosis Date   • Hiatal hernia    • PAF (paroxysmal atrial fibrillation) (CMS-HCC)    • CAD (coronary artery disease)    • Neuropathy (CMS-HCC)    • Elevated PSA      Past Surgical History   Procedure Laterality Date   • Vasectomy     • Inguinal hernia repair     • Eye surgery Bilateral      cataract sx     Family History   Problem Relation Age of Onset   • Hypertension Mother    • Heart Disease Father    • Cancer Sister    • Cancer Brother    • Arthritis Other    • Diabetes Other      History   Smoking status   • Never Smoker    Smokeless tobacco   • Never Used     Allergies   Allergen Reactions   • Amiodarone Unspecified     Felt like he was going to die   • Food      Watermelon, cantaloupe, strawberries, apples     Outpatient Encounter Prescriptions as of 8/16/2017   Medication Sig Dispense Refill   • gabapentin (NEURONTIN) 300 MG Cap Take 1 Cap by mouth every day. 90 Cap 1   • hydrochlorothiazide (HYDRODIURIL) 12.5 MG tablet Take 1 Tab by mouth every day. 90 Tab 3   • terazosin (HYTRIN) 5 MG Cap Take 1 Cap by mouth every day. 90 Cap 3   • tramadol (ULTRAM) 50 MG Tab Take 1 Tab by mouth 2 Times a Day. (Patient taking differently: Take 50 mg by mouth Once Every 12 Months.) 180 Tab 0   • apixaban (ELIQUIS) 5mg Tab Take 1 Tab by mouth 2 Times a Day. 180 Tab 3   • atorvastatin (LIPITOR) 20 MG Tab Take 1 Tab by mouth every day. 90 Tab 3   • sotalol (BETAPACE) 120 MG tablet Take 1 Tab by mouth 2 times a  "day. 60 Tab 11   • predniSONE (DELTASONE) 5 MG Tab Take 1 Tab by mouth every day. 30 Tab 0   • Cyanocobalamin (VITAMIN B-12 PO) Take 2,000 mcg by mouth every day.     • aspirin 81 MG tablet Take 81 mg by mouth every day.     • cephALEXin (KEFLEX) 500 MG Cap Take 1 Cap by mouth 4 times a day. 28 Cap 0   • omeprazole (PRILOSEC) 20 MG delayed-release capsule Take 1 Cap by mouth every day. 90 Cap 1     No facility-administered encounter medications on file as of 8/16/2017.     Review of Systems   Constitutional: Negative for fever and chills.   HENT: Negative for congestion.    Respiratory: Negative for cough and shortness of breath.    Cardiovascular: Positive for palpitations. Negative for chest pain, orthopnea, leg swelling and PND.   Gastrointestinal: Negative for nausea and abdominal pain.   Musculoskeletal: Negative for myalgias.   Skin: Negative for rash.   Neurological: Negative for dizziness, loss of consciousness and headaches.   Endo/Heme/Allergies: Does not bruise/bleed easily.        Objective:   /76 mmHg  Pulse 71  Ht 1.829 m (6' 0.01\")  Wt 85.276 kg (188 lb)  BMI 25.49 kg/m2  SpO2 92%    Physical Exam   Constitutional: He is oriented to person, place, and time. He appears well-developed and well-nourished.   HENT:   Mouth/Throat: Oropharynx is clear and moist.   Eyes: Conjunctivae and EOM are normal.   Neck: No JVD present. No thyroid mass present.   Cardiovascular: Normal rate, regular rhythm, S1 normal, S2 normal and normal pulses.  PMI is not displaced.  Exam reveals no gallop.    No murmur heard.  Pulses:       Carotid pulses are 2+ on the right side, and 2+ on the left side.       Radial pulses are 2+ on the right side, and 2+ on the left side.        Femoral pulses are 2+ on the right side, and 2+ on the left side.       Dorsalis pedis pulses are 2+ on the right side, and 2+ on the left side.   No peripheral edema.   Pulmonary/Chest: Effort normal and breath sounds normal.   Abdominal: " Soft. Normal appearance. He exhibits no abdominal bruit. There is no hepatosplenomegaly. There is no tenderness.   Musculoskeletal: Normal range of motion. He exhibits no edema.        Thoracic back: He exhibits no tenderness and no spasm.   Neurological: He is alert and oriented to person, place, and time.   Skin: No rash noted. No cyanosis. Nails show no clubbing.   Psychiatric: He has a normal mood and affect.       Assessment:     1. Atrial fibrillation, unspecified type (CMS-HCC)  EKG   2. Anticoagulant long-term use     3. Encounter for monitoring sotalol therapy         Medical Decision Making:  Today's Assessment / Status / Plan:     1. PAF on sotalol with QTC below 500 msec. Still with some breakthrough but not too troubling. If more problematic would recommend a PVI.  2. Anticoagulation continue.  3. Sleep apnea workup refuses.  4. F/U scheduled with Dr Shepard.

## 2017-11-10 LAB — EKG IMPRESSION: NORMAL

## 2018-01-09 ENCOUNTER — OFFICE VISIT (OUTPATIENT)
Dept: CARDIOLOGY | Facility: CLINIC | Age: 78
End: 2018-01-09
Payer: MEDICARE

## 2018-01-09 VITALS
HEART RATE: 65 BPM | WEIGHT: 189 LBS | HEIGHT: 72 IN | OXYGEN SATURATION: 98 % | DIASTOLIC BLOOD PRESSURE: 68 MMHG | SYSTOLIC BLOOD PRESSURE: 140 MMHG | BODY MASS INDEX: 25.6 KG/M2

## 2018-01-09 DIAGNOSIS — I48.0 PAROXYSMAL ATRIAL FIBRILLATION (HCC): ICD-10-CM

## 2018-01-09 DIAGNOSIS — Z79.899 HIGH RISK MEDICATION USE: ICD-10-CM

## 2018-01-09 DIAGNOSIS — E78.2 MIXED HYPERLIPIDEMIA: ICD-10-CM

## 2018-01-09 DIAGNOSIS — Z79.01 ANTICOAGULANT LONG-TERM USE: ICD-10-CM

## 2018-01-09 DIAGNOSIS — I25.10 ATHEROSCLEROSIS OF NATIVE CORONARY ARTERY OF NATIVE HEART WITHOUT ANGINA PECTORIS: ICD-10-CM

## 2018-01-09 PROCEDURE — 99215 OFFICE O/P EST HI 40 MIN: CPT | Performed by: INTERNAL MEDICINE

## 2018-01-09 RX ORDER — ATORVASTATIN CALCIUM 20 MG/1
20 TABLET, FILM COATED ORAL
Qty: 90 TAB | Refills: 3 | Status: SHIPPED | OUTPATIENT
Start: 2018-01-09 | End: 2018-07-26 | Stop reason: SINTOL

## 2018-01-09 ASSESSMENT — ENCOUNTER SYMPTOMS
CLAUDICATION: 0
SEIZURES: 0
HEADACHES: 0
MYALGIAS: 0
PALPITATIONS: 0
CHILLS: 0
BLURRED VISION: 0
SHORTNESS OF BREATH: 0
ORTHOPNEA: 0
PND: 0
DIZZINESS: 0
NERVOUS/ANXIOUS: 0
ABDOMINAL PAIN: 0
DEPRESSION: 0
DOUBLE VISION: 0
FEVER: 0
BLOOD IN STOOL: 0
INSOMNIA: 0

## 2018-01-09 NOTE — PROGRESS NOTES
Subjective:   Sherwin Hennessy is a 76 y.o. male with known history of   1. Coronary atherosclerosis  2. Paroxysmal atrial fibrillation on sotalol and also on anticoagulation with Eliquis  3. Hypertension  4. Dyslipidemia   Presenting today for follow-up evaluation of atrial fibrillation.    Patient had recurrent breakthrough episode of atrial fibrillation in December lasted for a total 8 days. While in A. fib patient is very symptomatic complaining of fatigue and tiredness he is willing to go for pulmonary vein isolation if needed. Denied any complaints of exertional chest pain pressure or tightness.  No bleeding issues while on anticoagulation.  Denied any complaints of dizziness lightheadedness or LOC.  No complaints of lower extremity edema or claudication.  Extremely anxious about the procedure but he has made up his mind to go for pulmonary vein isolation if at this the right choice. Will discuss case with Dr. Guardado.      Past Medical History:   Diagnosis Date   • CAD (coronary artery disease)    • Elevated PSA    • Hiatal hernia    • Neuropathy (CMS-HCC)    • PAF (paroxysmal atrial fibrillation) (CMS-HCC)      Past Surgical History:   Procedure Laterality Date   • EYE SURGERY Bilateral     cataract sx   • INGUINAL HERNIA REPAIR     • VASECTOMY       Family History   Problem Relation Age of Onset   • Hypertension Mother    • Heart Disease Father    • Cancer Sister    • Cancer Brother    • Arthritis Other    • Diabetes Other      History   Smoking Status   • Never Smoker   Smokeless Tobacco   • Never Used     Allergies   Allergen Reactions   • Amiodarone Unspecified     Felt like he was going to die   • Food      Watermelon, cantaloupe, strawberries, apples     Outpatient Encounter Prescriptions as of 1/9/2018   Medication Sig Dispense Refill   • tramadol (ULTRAM) 50 MG Tab Take 1 Tab by mouth 2 Times a Day. 180 Tab 0   • gabapentin (NEURONTIN) 300 MG Cap Take 1 Cap by mouth every day. 90 Cap 1   •  hydrochlorothiazide (HYDRODIURIL) 12.5 MG tablet Take 1 Tab by mouth every day. 90 Tab 3   • terazosin (HYTRIN) 5 MG Cap Take 1 Cap by mouth every day. 90 Cap 3   • apixaban (ELIQUIS) 5mg Tab Take 1 Tab by mouth 2 Times a Day. 180 Tab 3   • atorvastatin (LIPITOR) 20 MG Tab Take 1 Tab by mouth every day. 90 Tab 3   • sotalol (BETAPACE) 120 MG tablet Take 1 Tab by mouth 2 times a day. 60 Tab 11   • Cyanocobalamin (VITAMIN B-12 PO) Take 2,000 mcg by mouth every day.     • amoxicillin (AMOXIL) 500 MG Cap Take 1 Cap by mouth 3 times a day. 30 Cap 0   • predniSONE (DELTASONE) 5 MG Tab Take 1 Tab by mouth every day. 30 Tab 0   • azithromycin (ZITHROMAX) 250 MG Tab 2 tabs po day 1 then 1 tab po qd until gone 6 Tab 0   • aspirin 81 MG tablet Take 81 mg by mouth every day.     • cephALEXin (KEFLEX) 500 MG Cap Take 1 Cap by mouth 4 times a day. 28 Cap 0   • omeprazole (PRILOSEC) 20 MG delayed-release capsule Take 1 Cap by mouth every day. 90 Cap 1     No facility-administered encounter medications on file as of 1/9/2018.      Review of Systems   Constitutional: Negative for chills and fever.   HENT: Positive for hearing loss. Negative for tinnitus.    Eyes: Negative for blurred vision and double vision.   Respiratory: Negative for shortness of breath.    Cardiovascular: Negative for chest pain, palpitations, orthopnea, claudication, leg swelling and PND.        Having breakthrough episodes of atrial fibrillation which is causing him to have significant fatigue and tiredness   Gastrointestinal: Negative for abdominal pain and blood in stool.   Genitourinary: Negative for dysuria and hematuria.   Musculoskeletal: Negative for joint pain and myalgias.   Skin: Negative for rash.   Neurological: Negative for dizziness, seizures and headaches.   Psychiatric/Behavioral: Negative for depression. The patient is not nervous/anxious and does not have insomnia.         Objective:   /68   Pulse 65   Ht 1.829 m (6')   Wt 85.7 kg  (189 lb)   SpO2 98%   BMI 25.63 kg/m²     Physical Exam   Constitutional: He is oriented to person, place, and time. He appears well-developed and well-nourished. No distress.   HENT:   Head: Normocephalic and atraumatic.   Mouth/Throat: No oropharyngeal exudate.   Eyes: Pupils are equal, round, and reactive to light. Right eye exhibits no discharge. Left eye exhibits no discharge.   Neck: No JVD present. No tracheal deviation present.   Cardiovascular: Regular rhythm.  Bradycardia present.    No murmur heard.  Pulmonary/Chest: Effort normal and breath sounds normal. No respiratory distress. He has no wheezes. He has no rales.   Abdominal: Soft. Bowel sounds are normal. He exhibits no distension. There is no tenderness. There is no rebound.   Musculoskeletal: Normal range of motion. He exhibits no edema.   Neurological: He is alert and oriented to person, place, and time. No cranial nerve deficit.   Skin: Skin is warm and dry. He is not diaphoretic. No erythema.   Psychiatric: He has a normal mood and affect.    Results for [facility] (MRN [id number]) as of 2018 09:25   2017    Sodium 144   Potassium 4.0   Chloride 109 (H)   Co2 27   Anion Gap 12   Glucose 106 (H)   Bun 14   Creatinine 1.1   GFR If Non African American >60   Calcium 9.0   AST(SGOT) 17   ALT(SGPT) 21   Alkaline Phosphatase 79       Results for [facility] (MRN [id number]) as of 2017 13:46   2017    Cholesterol,Tot 114 (L) 112 (L)   Triglycerides 66 106   HDL 43.0 35.0 (L)   Non HDL Cholesterol 71 77   LDL 58 56     EK17  EKG personally reviewed by me.  Sinus bradycardia 58 bpm normal axis  ms.     EK17  EKG personally reviewed by me.  Sinus bradycardia 55 bpm normal axis  ms.    EK17  EKG personally reviewed by me.  Sinus rhythm 62 bpm normal axis QTc interval of 451 ms.    CT chest: 12/15/16  1.  No evidence for parenchymal lung mass. The abnormality seen on chest x-ray likely  represented artifact.  2.  Status post cholecystectomy with pneumobilia likely indicative of prior sphincterotomy.  3.  Coronary atherosclerosis    EK16  EKG personally reviewed by me.  Sinus rhythm 65 bpm normal axis no significant ST or T-wave changes.    Results for [facility] (MRN [id number]) as of 2017 15:15   2016    Sodium 143    Potassium 4.1    Chloride 107    Co2 29    Anion Gap 11    Glucose 109 (H)    Bun 16    Creatinine 1.1    GFR If Non African American >60    Calcium 9.0    AST(SGOT) 23    ALT(SGPT) 23    Alkaline Phosphatase 80    Cholesterol,Tot 149 114 (L)   Triglycerides 139 66   HDL 42.0 43.0   Non HDL Cholesterol 107 71   LDL 79 58   Chol-Hdl Ratio 3.55 2.65     Assessment:     1. Coronary atherosclerosis  2. Paroxysmal atrial fibrillation  3. Chronic anticoagulation  4. Dyslipidemia    Medical Decision Making:  Today's Assessment / Status / Plan:     1. No complaints of angina.  2. Continue sotalol 120 mg BID.  Since patient is having recurrent breakthrough episodes of atrial fibrillation on sotalol will discuss with Dr. Guardado regarding possible PVI since patient is interested in pursuing it   EKG from  showed QTc interval of 456 ms.   Check labs now  3. Continue Eliquis 5 mg BID.   No bleeding issues while on anticoagulation with a glucose  4. Refill for Lipitor 20 mg qHs sent to pharmacy.  LDL less than 70.    Follow-up in 6 months  Will discuss with Dr. Guardado regarding PVI.  Labs prior to next visit.    Thank you for allowing me to participate in taking care of patient.    Sandy Gentile MD.

## 2018-01-09 NOTE — LETTER
[facility] [address],   [address] [place] [address]  Phone: [phone]  Fax: [phone]              Sherwin Hennessy  1940    Encounter Date: 1/9/2018    Sandy Shepard M.D.          PROGRESS NOTE:  Subjective:   Sherwin Hennessy is a 76 y.o. male with known history of   1. Coronary atherosclerosis  2. Paroxysmal atrial fibrillation on sotalol and also on anticoagulation with Eliquis  3. Hypertension  4. Dyslipidemia   Presenting today for follow-up evaluation of atrial fibrillation.    Patient had recurrent breakthrough episode of atrial fibrillation in December lasted for a total 8 days. While in A. fib patient is very symptomatic complaining of fatigue and tiredness he is willing to go for pulmonary vein isolation if needed. Denied any complaints of exertional chest pain pressure or tightness.  No bleeding issues while on anticoagulation.  Denied any complaints of dizziness lightheadedness or LOC.  No complaints of lower extremity edema or claudication.  Extremely anxious about the procedure but he has made up his mind to go for pulmonary vein isolation if at this the right choice. Will discuss case with Dr. Guardado.      Past Medical History:   Diagnosis Date   • CAD (coronary artery disease)    • Elevated PSA    • Hiatal hernia    • Neuropathy (CMS-HCC)    • PAF (paroxysmal atrial fibrillation) (CMS-HCC)      Past Surgical History:   Procedure Laterality Date   • EYE SURGERY Bilateral     cataract sx   • INGUINAL HERNIA REPAIR     • VASECTOMY       Family History   Problem Relation Age of Onset   • Hypertension Mother    • Heart Disease Father    • Cancer Sister    • Cancer Brother    • Arthritis Other    • Diabetes Other      History   Smoking Status   • Never Smoker   Smokeless Tobacco   • Never Used     Allergies   Allergen Reactions   • Amiodarone Unspecified     Felt like he was going to die   • Food      Watermelon,  cantaloupe, strawberries, apples     Outpatient Encounter Prescriptions as of 1/9/2018   Medication Sig Dispense Refill   • tramadol (ULTRAM) 50 MG Tab Take 1 Tab by mouth 2 Times a Day. 180 Tab 0   • gabapentin (NEURONTIN) 300 MG Cap Take 1 Cap by mouth every day. 90 Cap 1   • hydrochlorothiazide (HYDRODIURIL) 12.5 MG tablet Take 1 Tab by mouth every day. 90 Tab 3   • terazosin (HYTRIN) 5 MG Cap Take 1 Cap by mouth every day. 90 Cap 3   • apixaban (ELIQUIS) 5mg Tab Take 1 Tab by mouth 2 Times a Day. 180 Tab 3   • atorvastatin (LIPITOR) 20 MG Tab Take 1 Tab by mouth every day. 90 Tab 3   • sotalol (BETAPACE) 120 MG tablet Take 1 Tab by mouth 2 times a day. 60 Tab 11   • Cyanocobalamin (VITAMIN B-12 PO) Take 2,000 mcg by mouth every day.     • amoxicillin (AMOXIL) 500 MG Cap Take 1 Cap by mouth 3 times a day. 30 Cap 0   • predniSONE (DELTASONE) 5 MG Tab Take 1 Tab by mouth every day. 30 Tab 0   • azithromycin (ZITHROMAX) 250 MG Tab 2 tabs po day 1 then 1 tab po qd until gone 6 Tab 0   • aspirin 81 MG tablet Take 81 mg by mouth every day.     • cephALEXin (KEFLEX) 500 MG Cap Take 1 Cap by mouth 4 times a day. 28 Cap 0   • omeprazole (PRILOSEC) 20 MG delayed-release capsule Take 1 Cap by mouth every day. 90 Cap 1     No facility-administered encounter medications on file as of 1/9/2018.      Review of Systems   Constitutional: Negative for chills and fever.   HENT: Positive for hearing loss. Negative for tinnitus.    Eyes: Negative for blurred vision and double vision.   Respiratory: Negative for shortness of breath.    Cardiovascular: Negative for chest pain, palpitations, orthopnea, claudication, leg swelling and PND.        Having breakthrough episodes of atrial fibrillation which is causing him to have significant fatigue and tiredness   Gastrointestinal: Negative for abdominal pain and blood in stool.   Genitourinary: Negative for dysuria and hematuria.   Musculoskeletal: Negative for joint pain and myalgias.      Skin: Negative for rash.   Neurological: Negative for dizziness, seizures and headaches.   Psychiatric/Behavioral: Negative for depression. The patient is not nervous/anxious and does not have insomnia.         Objective:   /68   Pulse 65   Ht 1.829 m (6')   Wt 85.7 kg (189 lb)   SpO2 98%   BMI 25.63 kg/m²      Physical Exam   Constitutional: He is oriented to person, place, and time. He appears well-developed and well-nourished. No distress.   HENT:   Head: Normocephalic and atraumatic.   Mouth/Throat: No oropharyngeal exudate.   Eyes: Pupils are equal, round, and reactive to light. Right eye exhibits no discharge. Left eye exhibits no discharge.   Neck: No JVD present. No tracheal deviation present.   Cardiovascular: Regular rhythm.  Bradycardia present.    No murmur heard.  Pulmonary/Chest: Effort normal and breath sounds normal. No respiratory distress. He has no wheezes. He has no rales.   Abdominal: Soft. Bowel sounds are normal. He exhibits no distension. There is no tenderness. There is no rebound.   Musculoskeletal: Normal range of motion. He exhibits no edema.   Neurological: He is alert and oriented to person, place, and time. No cranial nerve deficit.   Skin: Skin is warm and dry. He is not diaphoretic. No erythema.   Psychiatric: He has a normal mood and affect.    Results for [facility] (MRN [id number]) as of 2018 09:25   2017    Sodium 144   Potassium 4.0   Chloride 109 (H)   Co2 27   Anion Gap 12   Glucose 106 (H)   Bun 14   Creatinine 1.1   GFR If Non African American >60   Calcium 9.0   AST(SGOT) 17   ALT(SGPT) 21   Alkaline Phosphatase 79       Results for [facility] (MRN [id number]) as of 2017 13:46   2017    Cholesterol,Tot 114 (L) 112 (L)   Triglycerides 66 106   HDL 43.0 35.0 (L)   Non HDL Cholesterol 71 77   LDL 58 56     EK17  EKG personally reviewed by me.  Sinus bradycardia 58 bpm normal axis  ms.     EK17  EKG personally  reviewed by me.  Sinus bradycardia 55 bpm normal axis  ms.    EK17  EKG personally reviewed by me.  Sinus rhythm 62 bpm normal axis QTc interval of 451 ms.    CT chest: 12/15/16  1.  No evidence for parenchymal lung mass. The abnormality seen on chest x-ray likely represented artifact.  2.  Status post cholecystectomy with pneumobilia likely indicative of prior sphincterotomy.  3.  Coronary atherosclerosis    EK16  EKG personally reviewed by me.  Sinus rhythm 65 bpm normal axis no significant ST or T-wave changes.    Results for [facility] (MRN [id number]) as of 2017 15:15   2016    Sodium 143    Potassium 4.1    Chloride 107    Co2 29    Anion Gap 11    Glucose 109 (H)    Bun 16    Creatinine 1.1    GFR If Non African American >60    Calcium 9.0    AST(SGOT) 23    ALT(SGPT) 23    Alkaline Phosphatase 80    Cholesterol,Tot 149 114 (L)   Triglycerides 139 66   HDL 42.0 43.0   Non HDL Cholesterol 107 71   LDL 79 58   Chol-Hdl Ratio 3.55 2.65     Assessment:     1. Coronary atherosclerosis  2. Paroxysmal atrial fibrillation  3. Chronic anticoagulation  4. Dyslipidemia    Medical Decision Making:  Today's Assessment / Status / Plan:     1. No complaints of angina.  2. Continue sotalol 120 mg BID.  Since patient is having recurrent breakthrough episodes of atrial fibrillation on sotalol will discuss with Dr. Guardado regarding possible PVI since patient is interested in pursuing it   EKG from  showed QTc interval of 456 ms.   Check labs now  3. Continue Eliquis 5 mg BID.   No bleeding issues while on anticoagulation with a glucose  4. Refill for Lipitor 20 mg qHs sent to pharmacy.  LDL less than 70.    Follow-up in 6 months  Will discuss with Dr. Guardado regarding PVI.  Labs prior to next visit.    Thank you for allowing me to participate in taking care of patient.    Sandy Gentile MD.      Shen Granger M.D.  [address] [facility] [address]  VIA In  Basket

## 2018-01-16 DIAGNOSIS — I48.0 PAROXYSMAL ATRIAL FIBRILLATION (HCC): ICD-10-CM

## 2018-01-17 ENCOUNTER — TELEPHONE (OUTPATIENT)
Dept: CARDIOLOGY | Facility: MEDICAL CENTER | Age: 78
End: 2018-01-17

## 2018-01-17 NOTE — TELEPHONE ENCOUNTER
Dr. Guardado,   Dr. Shepard saw pt on 1/9. She forgot to ask you about ablation on this pt. Asked me to forward this to you for your consideration. Pt. having > AF sx's. She's off tomorrow but will be in a little to work on charts. can call her, or if you want to go ahead w/ ablation w/o seeing pt. first (last seen by you 8/17/17) you can have your nurse/ Dena call to set up. Pt is aware.

## 2018-01-17 NOTE — TELEPHONE ENCOUNTER
----- Message from Inna Dimas R.N. sent at 1/17/2018 10:14 AM PST -----  Regarding: FW: no contact yet on ablation  Contact: [phone]    AM, your last note states you were going to discuss with Cristian Guardado    ----- Message -----  From: Lisa Miramontes  Sent: 1/17/2018   9:53 AM  To: Inna Dimas R.N., Amy Alcantar R.N.  Subject: no contact yet on ablation                       AM/ziggy    Pt calling to follow up on AM's recommendation for pt to have ablation.  Pt states no one has contacted him yet and is hoping to have it scheduled soon. Please call Sherwin [phone].

## 2018-01-18 NOTE — TELEPHONE ENCOUNTER
If having worsening a fib can schedule a fib RFA as per my note.  (Routing comment) from Dr. Geo Fernandes,  After reviewing the notes it looks like the patient is ready to schedule PVI.   He doesn't need to see Dr. Guardado but can be scheduled for OV first, if he prefers.    Thanks,     Alice

## 2018-01-19 RX ORDER — SOTALOL HYDROCHLORIDE 120 MG/1
120 TABLET ORAL 2 TIMES DAILY
Qty: 60 TAB | Refills: 11 | Status: ON HOLD | OUTPATIENT
Start: 2018-01-19 | End: 2018-02-27

## 2018-01-19 NOTE — TELEPHONE ENCOUNTER
Patient scheduled for an afib ablation w/RANJAN on 2-26-18 at [facility] with Dr. Guardado.

## 2018-01-19 NOTE — TELEPHONE ENCOUNTER
Dr. Guardado,    I'm going to call this patient to schedule PVI. Are there any medications you would like this patient to hold for this procedure?    Thank You,  Dena

## 2018-02-23 ENCOUNTER — APPOINTMENT (OUTPATIENT)
Dept: ADMISSIONS | Facility: MEDICAL CENTER | Age: 78
End: 2018-02-23
Attending: INTERNAL MEDICINE
Payer: MEDICARE

## 2018-02-23 RX ORDER — TRAMADOL HYDROCHLORIDE 50 MG/1
50 TABLET ORAL
COMMUNITY
End: 2018-03-14 | Stop reason: SDUPTHER

## 2018-02-23 NOTE — OR NURSING
Per Nixon/cardiac cath , pt does not require a ride home for procedure scheduled 2.26.18; per Nixon, pt will be spending 1 night post op; pt notified and verb understanding.

## 2018-02-26 ENCOUNTER — HOSPITAL ENCOUNTER (OUTPATIENT)
Facility: MEDICAL CENTER | Age: 78
End: 2018-02-27
Attending: INTERNAL MEDICINE | Admitting: INTERNAL MEDICINE
Payer: MEDICARE

## 2018-02-26 LAB
ACT BLD: 279 SEC (ref 74–137)
ACT BLD: 290 SEC (ref 74–137)
ACT BLD: 313 SEC (ref 74–137)
ACT BLD: 318 SEC (ref 74–137)
ALBUMIN SERPL BCP-MCNC: 4.6 G/DL (ref 3.2–4.9)
ALBUMIN/GLOB SERPL: 1.6 G/DL
ALP SERPL-CCNC: 85 U/L (ref 30–99)
ALT SERPL-CCNC: 12 U/L (ref 2–50)
ANION GAP SERPL CALC-SCNC: 11 MMOL/L (ref 0–11.9)
AST SERPL-CCNC: 22 U/L (ref 12–45)
BASOPHILS # BLD AUTO: 0.3 % (ref 0–1.8)
BASOPHILS # BLD: 0.02 K/UL (ref 0–0.12)
BILIRUB SERPL-MCNC: 1.2 MG/DL (ref 0.1–1.5)
BUN SERPL-MCNC: 14 MG/DL (ref 8–22)
CALCIUM SERPL-MCNC: 9.5 MG/DL (ref 8.5–10.5)
CHLORIDE SERPL-SCNC: 105 MMOL/L (ref 96–112)
CO2 SERPL-SCNC: 23 MMOL/L (ref 20–33)
CREAT SERPL-MCNC: 1.02 MG/DL (ref 0.5–1.4)
EKG IMPRESSION: NORMAL
EKG IMPRESSION: NORMAL
EOSINOPHIL # BLD AUTO: 0.15 K/UL (ref 0–0.51)
EOSINOPHIL NFR BLD: 1.9 % (ref 0–6.9)
ERYTHROCYTE [DISTWIDTH] IN BLOOD BY AUTOMATED COUNT: 41.2 FL (ref 35.9–50)
GLOBULIN SER CALC-MCNC: 2.9 G/DL (ref 1.9–3.5)
GLUCOSE SERPL-MCNC: 120 MG/DL (ref 65–99)
HCT VFR BLD AUTO: 49.9 % (ref 42–52)
HGB BLD-MCNC: 17.7 G/DL (ref 14–18)
IMM GRANULOCYTES # BLD AUTO: 0.02 K/UL (ref 0–0.11)
IMM GRANULOCYTES NFR BLD AUTO: 0.3 % (ref 0–0.9)
INR PPP: 1.42 (ref 0.87–1.13)
LYMPHOCYTES # BLD AUTO: 1.48 K/UL (ref 1–4.8)
LYMPHOCYTES NFR BLD: 19.1 % (ref 22–41)
MCH RBC QN AUTO: 31.3 PG (ref 27–33)
MCHC RBC AUTO-ENTMCNC: 35.5 G/DL (ref 33.7–35.3)
MCV RBC AUTO: 88.2 FL (ref 81.4–97.8)
MONOCYTES # BLD AUTO: 0.58 K/UL (ref 0–0.85)
MONOCYTES NFR BLD AUTO: 7.5 % (ref 0–13.4)
NEUTROPHILS # BLD AUTO: 5.5 K/UL (ref 1.82–7.42)
NEUTROPHILS NFR BLD: 70.9 % (ref 44–72)
NRBC # BLD AUTO: 0 K/UL
NRBC BLD-RTO: 0 /100 WBC
PLATELET # BLD AUTO: 125 K/UL (ref 164–446)
PMV BLD AUTO: 10.6 FL (ref 9–12.9)
POTASSIUM SERPL-SCNC: 3.8 MMOL/L (ref 3.6–5.5)
PROT SERPL-MCNC: 7.5 G/DL (ref 6–8.2)
PROTHROMBIN TIME: 17 SEC (ref 12–14.6)
RBC # BLD AUTO: 5.66 M/UL (ref 4.7–6.1)
SODIUM SERPL-SCNC: 139 MMOL/L (ref 135–145)
TSH SERPL DL<=0.005 MIU/L-ACNC: 2.26 UIU/ML (ref 0.38–5.33)
WBC # BLD AUTO: 7.8 K/UL (ref 4.8–10.8)

## 2018-02-26 PROCEDURE — 700111 HCHG RX REV CODE 636 W/ 250 OVERRIDE (IP)

## 2018-02-26 PROCEDURE — C1759 CATH, INTRA ECHOCARDIOGRAPHY: HCPCS

## 2018-02-26 PROCEDURE — 36620 INSERTION CATHETER ARTERY: CPT | Mod: XU

## 2018-02-26 PROCEDURE — C1732 CATH, EP, DIAG/ABL, 3D/VECT: HCPCS

## 2018-02-26 PROCEDURE — 93613 INTRACARDIAC EPHYS 3D MAPG: CPT

## 2018-02-26 PROCEDURE — 84443 ASSAY THYROID STIM HORMONE: CPT

## 2018-02-26 PROCEDURE — 85025 COMPLETE CBC W/AUTO DIFF WBC: CPT

## 2018-02-26 PROCEDURE — 93325 DOPPLER ECHO COLOR FLOW MAPG: CPT

## 2018-02-26 PROCEDURE — 93005 ELECTROCARDIOGRAM TRACING: CPT | Performed by: INTERNAL MEDICINE

## 2018-02-26 PROCEDURE — 360980 HCHG SINGLE TRANSDUCER

## 2018-02-26 PROCEDURE — 305545 HCHG DOUBLE TRANSDUCER

## 2018-02-26 PROCEDURE — 304952 HCHG R 2 PADS

## 2018-02-26 PROCEDURE — C1893 INTRO/SHEATH, FIXED,NON-PEEL: HCPCS

## 2018-02-26 PROCEDURE — A9270 NON-COVERED ITEM OR SERVICE: HCPCS | Performed by: INTERNAL MEDICINE

## 2018-02-26 PROCEDURE — C1730 CATH, EP, 19 OR FEW ELECT: HCPCS

## 2018-02-26 PROCEDURE — 700101 HCHG RX REV CODE 250

## 2018-02-26 PROCEDURE — 305387 HCHG SUTURES

## 2018-02-26 PROCEDURE — 93662 INTRACARDIAC ECG (ICE): CPT

## 2018-02-26 PROCEDURE — 305383 HCHG CARTO3 REF PATCH

## 2018-02-26 PROCEDURE — G0378 HOSPITAL OBSERVATION PER HR: HCPCS

## 2018-02-26 PROCEDURE — 93321 DOPPLER ECHO F-UP/LMTD STD: CPT

## 2018-02-26 PROCEDURE — 700102 HCHG RX REV CODE 250 W/ 637 OVERRIDE(OP): Performed by: INTERNAL MEDICINE

## 2018-02-26 PROCEDURE — 80053 COMPREHEN METABOLIC PANEL: CPT

## 2018-02-26 PROCEDURE — C1894 INTRO/SHEATH, NON-LASER: HCPCS

## 2018-02-26 PROCEDURE — 93312 ECHO TRANSESOPHAGEAL: CPT

## 2018-02-26 PROCEDURE — 00537 ANES CARDIAC EP PROCEDURES: CPT

## 2018-02-26 PROCEDURE — 93656 COMPRE EP EVAL ABLTJ ATR FIB: CPT

## 2018-02-26 PROCEDURE — 85347 COAGULATION TIME ACTIVATED: CPT

## 2018-02-26 PROCEDURE — 85610 PROTHROMBIN TIME: CPT

## 2018-02-26 PROCEDURE — 160002 HCHG RECOVERY MINUTES (STAT)

## 2018-02-26 PROCEDURE — 93010 ELECTROCARDIOGRAM REPORT: CPT | Mod: 76 | Performed by: INTERNAL MEDICINE

## 2018-02-26 PROCEDURE — 360995 HCHG BAYLIS TRANSSEPTAL NEEDLE

## 2018-02-26 RX ORDER — TERAZOSIN 5 MG/1
5 CAPSULE ORAL DAILY
Status: DISCONTINUED | OUTPATIENT
Start: 2018-02-26 | End: 2018-02-27 | Stop reason: HOSPADM

## 2018-02-26 RX ORDER — HYDROCHLOROTHIAZIDE 25 MG/1
12.5 TABLET ORAL DAILY
Status: DISCONTINUED | OUTPATIENT
Start: 2018-02-26 | End: 2018-02-27 | Stop reason: HOSPADM

## 2018-02-26 RX ORDER — PROTAMINE SULFATE 10 MG/ML
INJECTION, SOLUTION INTRAVENOUS
Status: DISPENSED
Start: 2018-02-26 | End: 2018-02-27

## 2018-02-26 RX ORDER — HEPARIN SODIUM,PORCINE 1000/ML
VIAL (ML) INJECTION
Status: COMPLETED
Start: 2018-02-26 | End: 2018-02-26

## 2018-02-26 RX ORDER — TRAMADOL HYDROCHLORIDE 50 MG/1
50 TABLET ORAL EVERY 6 HOURS PRN
Status: DISCONTINUED | OUTPATIENT
Start: 2018-02-26 | End: 2018-02-27 | Stop reason: HOSPADM

## 2018-02-26 RX ORDER — SOTALOL HYDROCHLORIDE 80 MG/1
120 TABLET ORAL 2 TIMES DAILY
Status: DISCONTINUED | OUTPATIENT
Start: 2018-02-26 | End: 2018-02-26

## 2018-02-26 RX ORDER — GABAPENTIN 300 MG/1
300 CAPSULE ORAL DAILY
Status: DISCONTINUED | OUTPATIENT
Start: 2018-02-26 | End: 2018-02-27 | Stop reason: HOSPADM

## 2018-02-26 RX ORDER — ATORVASTATIN CALCIUM 20 MG/1
20 TABLET, FILM COATED ORAL
Status: DISCONTINUED | OUTPATIENT
Start: 2018-02-26 | End: 2018-02-27 | Stop reason: HOSPADM

## 2018-02-26 RX ORDER — LIDOCAINE HYDROCHLORIDE 20 MG/ML
INJECTION, SOLUTION INFILTRATION; PERINEURAL
Status: COMPLETED
Start: 2018-02-26 | End: 2018-02-26

## 2018-02-26 RX ADMIN — TERAZOSIN HYDROCHLORIDE 5 MG: 5 CAPSULE ORAL at 22:14

## 2018-02-26 RX ADMIN — LIDOCAINE HYDROCHLORIDE: 20 INJECTION, SOLUTION INFILTRATION; PERINEURAL at 15:45

## 2018-02-26 RX ADMIN — APIXABAN 5 MG: 5 TABLET, FILM COATED ORAL at 22:14

## 2018-02-26 RX ADMIN — HEPARIN SODIUM: 1000 INJECTION, SOLUTION INTRAVENOUS; SUBCUTANEOUS at 15:46

## 2018-02-26 RX ADMIN — HEPARIN SODIUM 2000 UNITS: 1000 INJECTION, SOLUTION INTRAVENOUS; SUBCUTANEOUS at 17:08

## 2018-02-26 RX ADMIN — GABAPENTIN 300 MG: 300 CAPSULE ORAL at 22:14

## 2018-02-26 RX ADMIN — HEPARIN SODIUM 6000 UNITS: 200 INJECTION, SOLUTION INTRAVENOUS at 15:45

## 2018-02-26 RX ADMIN — ATORVASTATIN CALCIUM 20 MG: 20 TABLET, FILM COATED ORAL at 22:14

## 2018-02-26 ASSESSMENT — COGNITIVE AND FUNCTIONAL STATUS - GENERAL
SUGGESTED CMS G CODE MODIFIER MOBILITY: CH
SUGGESTED CMS G CODE MODIFIER DAILY ACTIVITY: CH
MOBILITY SCORE: 24
DAILY ACTIVITIY SCORE: 24

## 2018-02-26 ASSESSMENT — PAIN SCALES - GENERAL
PAINLEVEL_OUTOF10: 0

## 2018-02-26 ASSESSMENT — LIFESTYLE VARIABLES
EVER_SMOKED: NEVER
ALCOHOL_USE: NO

## 2018-02-26 ASSESSMENT — PATIENT HEALTH QUESTIONNAIRE - PHQ9
SUM OF ALL RESPONSES TO PHQ QUESTIONS 1-9: 0
1. LITTLE INTEREST OR PLEASURE IN DOING THINGS: NOT AT ALL
SUM OF ALL RESPONSES TO PHQ9 QUESTIONS 1 AND 2: 0

## 2018-02-26 NOTE — H&P
Physician H&P    Patient ID:  Sherwin Hennessy  [id number]  77 y.o. male  1940    History:  Primary Diagnosis: Recurrent PAF despite AA meds for RFA    HPI:  Recurrent a fib despite AA meds. Here for PVI. No F,C,S. No chest pain.     Past Medical History:  has a past medical history of Arthritis; BPH (benign prostatic hyperplasia); CAD (coronary artery disease); Cataract; Elevated PSA; Heart burn; Hiatal hernia; High cholesterol; Hypertension; Neuropathy (CMS-[facility]); PAF (paroxysmal atrial fibrillation) (CMS-HCC); and Stroke (CMS-HCC).  Past Surgical History:  has a past surgical history that includes vasectomy; eye surgery (Bilateral); inguinal hernia repair; and other abdominal surgery.  Past Social History:  reports that he has never smoked. He has never used smokeless tobacco. He reports that he does not drink alcohol or use drugs.  Past Family History:   Family History   Problem Relation Age of Onset   • Hypertension Mother    • Heart Disease Father    • Cancer Sister    • Cancer Brother    • Arthritis Other    • Diabetes Other      Allergies: Amiodarone and Food    Current Medications:  Prior to Admission medications    Medication Sig Start Date End Date Taking? Authorizing Provider   tramadol (ULTRAM) 50 MG Tab Take 50 mg by mouth 1 time daily as needed.   Yes Physician Outpatient   sotalol (BETAPACE) 120 MG tablet Take 1 Tab by mouth 2 times a day. 1/19/18   Sandy Shepard M.D.   gabapentin (NEURONTIN) 300 MG Cap Take 1 Cap by mouth every day.  Patient taking differently: Take 300 mg by mouth every evening. 1/19/18   Shen Granger M.D.   atorvastatin (LIPITOR) 20 MG Tab Take 1 Tab by mouth every bedtime. 1/9/18   Sandy Shepard M.D.   hydrochlorothiazide (HYDRODIURIL) 12.5 MG tablet Take 1 Tab by mouth every day. 7/12/17   Shen Granger M.D.   terazosin (HYTRIN) 5 MG Cap Take 1 Cap by mouth every day.  Patient taking differently: Take 5 mg by mouth every evening. 3/27/17   Shen BEEBE  TAMICA Granger   apixaban (ELIQUIS) 5mg Tab Take 1 Tab by mouth 2 Times a Day. 3/27/17   Shen Granger M.D.   omeprazole (PRILOSEC) 20 MG delayed-release capsule Take 1 Cap by mouth every day. 7/7/16   ANJU Álvarez   Cyanocobalamin (VITAMIN B-12 PO) Take 2,000 mcg by mouth every day.    Physician Outpatient       Review of Systems:  ROS  Blood pressure 150/62, pulse 70, temperature 36.7 °C (98 °F), resp. rate 16, height 1.829 m (6'), weight 85.3 kg (188 lb), SpO2 95 %.    Physical Examination:  Physical Exam   Constitutional: He is oriented to person, place, and time. He appears well-developed. No distress.   HENT:   Mouth/Throat: Mucous membranes are normal.   Eyes: Conjunctivae and EOM are normal.   Neck: No JVD present. No tracheal deviation present. No thyroid mass and no thyromegaly present.   Cardiovascular: Normal rate, regular rhythm and intact distal pulses.    No murmur heard.  Pulmonary/Chest: Effort normal and breath sounds normal. No respiratory distress. He exhibits no tenderness.   Abdominal: Soft. There is no tenderness.   Musculoskeletal: Normal range of motion. He exhibits no edema.   Neurological: He is alert and oriented to person, place, and time. He has normal strength. He displays no tremor.   Skin: Skin is warm and dry. He is not diaphoretic.   Psychiatric: He has a normal mood and affect. His behavior is normal.   Vitals reviewed.      Impression:  1. PAF for a fib RFA.  The risks, benefits,and alternatives to atrial fibrillation ablation with general anesthesia were discussed in great detail. Specific risks mentioned including bleeding, infection, arteriovenous fistula/pseudoaneurysm related to sheath placement, cardiac perforation with possible tamponade requiring pericardiocentesis or possible open heart surgery were discussed. In addition,we discussed atrial fibrillation ablation specific complications including pulmonary vein stenosis, left atrial perforation  (2-4%), and nerve damage involving the recurrent laryngeal nerve, the vagus nerve with resulting gastroparesis, and the phrenic nerve.  Also mentioned was a 1/400 (0.25%) occurrence of atrial esophageal fistula, which is an abnormal communication between the esophagus and the left atrium; this complication is often fatal. Lastly the risks of death, myocardial infarction, stroke, DVT and PE were discussed. The patient verbalized understanding of these potential complications and wishes to proceed with this procedure.  The risks, benefits, and alternatives to transesophageal echocardiogram with IV sedation were discussed with the patient in specific detail, including oropharyngeal and esophageal traumas including hoarseness and dysphagia after the procedure. Rare cases demonstrating serious or fatal complications associated with transesophageal echocardiogram have been reported in the adult population, including cardiac, pulmonary and bleeding complications in less than 1% of people. Patients with an identified intracardiac thrombus are at increased risk for embolic events and this appears to be reduced with anticoagulant therapy. The patient verbalized understandings about these  possible complications and wishes to proceed with this procedure        Pre Procedure Assessment:  Pre-Procedure Assessment - Applicable for patients receiving sedation by non-anesthesia practitioner.  Previous drug history, other anesthetic experiences, potential anesthetic problems and choice of anesthesia assessed, discussed with patient.     No prior complications.  Results of relevant diagnostic studies reviewed.    Plan of Sedation:  Patient assessed immediately prior to sedationPatient deemed appropriate candidate for conscious sedation options and plans discussed with patient / family    ASA 3 - Patient with severe systemic disease

## 2018-02-27 VITALS
SYSTOLIC BLOOD PRESSURE: 125 MMHG | HEIGHT: 72 IN | OXYGEN SATURATION: 90 % | HEART RATE: 80 BPM | TEMPERATURE: 98.8 F | DIASTOLIC BLOOD PRESSURE: 63 MMHG | RESPIRATION RATE: 16 BRPM | WEIGHT: 199.96 LBS | BODY MASS INDEX: 27.08 KG/M2

## 2018-02-27 LAB
ALBUMIN SERPL BCP-MCNC: 3.3 G/DL (ref 3.2–4.9)
ALBUMIN/GLOB SERPL: 1.5 G/DL
ALP SERPL-CCNC: 66 U/L (ref 30–99)
ALT SERPL-CCNC: 11 U/L (ref 2–50)
ANION GAP SERPL CALC-SCNC: 7 MMOL/L (ref 0–11.9)
AST SERPL-CCNC: 28 U/L (ref 12–45)
BILIRUB SERPL-MCNC: 0.9 MG/DL (ref 0.1–1.5)
BUN SERPL-MCNC: 17 MG/DL (ref 8–22)
CALCIUM SERPL-MCNC: 8.2 MG/DL (ref 8.5–10.5)
CHLORIDE SERPL-SCNC: 106 MMOL/L (ref 96–112)
CO2 SERPL-SCNC: 25 MMOL/L (ref 20–33)
CREAT SERPL-MCNC: 1.12 MG/DL (ref 0.5–1.4)
EKG IMPRESSION: NORMAL
ERYTHROCYTE [DISTWIDTH] IN BLOOD BY AUTOMATED COUNT: 41.5 FL (ref 35.9–50)
GLOBULIN SER CALC-MCNC: 2.2 G/DL (ref 1.9–3.5)
GLUCOSE SERPL-MCNC: 145 MG/DL (ref 65–99)
HCT VFR BLD AUTO: 41.3 % (ref 42–52)
HGB BLD-MCNC: 14.1 G/DL (ref 14–18)
MCH RBC QN AUTO: 30.2 PG (ref 27–33)
MCHC RBC AUTO-ENTMCNC: 34.1 G/DL (ref 33.7–35.3)
MCV RBC AUTO: 88.4 FL (ref 81.4–97.8)
PLATELET # BLD AUTO: 116 K/UL (ref 164–446)
PMV BLD AUTO: 10.2 FL (ref 9–12.9)
POTASSIUM SERPL-SCNC: 3.4 MMOL/L (ref 3.6–5.5)
PROT SERPL-MCNC: 5.5 G/DL (ref 6–8.2)
RBC # BLD AUTO: 4.67 M/UL (ref 4.7–6.1)
SODIUM SERPL-SCNC: 138 MMOL/L (ref 135–145)
WBC # BLD AUTO: 8.4 K/UL (ref 4.8–10.8)

## 2018-02-27 PROCEDURE — G0378 HOSPITAL OBSERVATION PER HR: HCPCS

## 2018-02-27 PROCEDURE — 700102 HCHG RX REV CODE 250 W/ 637 OVERRIDE(OP): Performed by: INTERNAL MEDICINE

## 2018-02-27 PROCEDURE — A9270 NON-COVERED ITEM OR SERVICE: HCPCS | Performed by: INTERNAL MEDICINE

## 2018-02-27 PROCEDURE — 93010 ELECTROCARDIOGRAM REPORT: CPT | Performed by: INTERNAL MEDICINE

## 2018-02-27 PROCEDURE — 80053 COMPREHEN METABOLIC PANEL: CPT

## 2018-02-27 PROCEDURE — 85027 COMPLETE CBC AUTOMATED: CPT

## 2018-02-27 PROCEDURE — 93005 ELECTROCARDIOGRAM TRACING: CPT | Performed by: INTERNAL MEDICINE

## 2018-02-27 PROCEDURE — 36415 COLL VENOUS BLD VENIPUNCTURE: CPT

## 2018-02-27 RX ORDER — OMEPRAZOLE 20 MG/1
20 CAPSULE, DELAYED RELEASE ORAL DAILY
Qty: 30 CAP | Refills: 0 | Status: SHIPPED | OUTPATIENT
Start: 2018-02-27 | End: 2019-11-11

## 2018-02-27 RX ORDER — POTASSIUM CHLORIDE 20 MEQ/1
20 TABLET, EXTENDED RELEASE ORAL ONCE
Qty: 1 TAB | Refills: 0 | Status: SHIPPED | OUTPATIENT
Start: 2018-02-27 | End: 2018-02-27

## 2018-02-27 RX ADMIN — APIXABAN 5 MG: 5 TABLET, FILM COATED ORAL at 08:56

## 2018-02-27 RX ADMIN — HYDROCHLOROTHIAZIDE 12.5 MG: 12.5 TABLET ORAL at 08:57

## 2018-02-27 ASSESSMENT — PAIN SCALES - GENERAL: PAINLEVEL_OUTOF10: 0

## 2018-02-27 NOTE — DISCHARGE SUMMARY
DISCHARGE DIAGNOSES:  1.  Paroxysmal atrial fibrillation.  2.  History of gastroesophageal reflux disease.  3.  Chronic anticoagulation.  4.  Prior sotalol usage.  5.  Coronary artery disease without angina.    HISTORY OF PRESENT HOSPITALIZATION:  The patient is a 77-year-old    male who is followed in [place] by Dr. Thom Gudino.  He was   referred for recurrent PAF despite antiarrhythmic medications in the form of   sotalol.  He was seen by Dr. Guardado and scheduled for repeat pulmonary vein   isolation on 02/26/2018.  Patient tolerated the procedure well.  Reported that   procedure conclusions were as follows:  1.  Paroxysmal atrial fibrillation coming from AFib in the right upper   pulmonary vein.  2.  Successful RF ablation, pulmonary vein isolation procedure.    Recommendations were to resume anticoagulation.  Complication of the procedure   was none.  This a.m., the patient is feeling well, somewhat of a sore throat,   but that is improving.  He offers no complaints of chest discomfort and   generally feels ready to go home.  Database review reveals he is afebrile at   37.1, blood pressure 125/63.  H and H is stable at 14.1 and 41.3 with   preadmission H and H 17.7 and 49.9.  Electrolytes:  Sodium 138, potassium 3.4,   chloride 106, CO2 25, BUN 17, and creatinine 1.12.  EGFR greater than 60.    His prior potassium had been stable between 3.8 and 4.0.    DISCHARGE MEDICATIONS:  Will include Eliquis 5 mg twice daily, Lipitor 20 mg   daily, Neurontin 300 mg daily, HCTZ 12.5 mg daily, and Terazosin 5 mg daily,   and he will continue his omeprazole 20 mg daily for 1 month.  The patient is   anxious to go home.  His potassium was slightly low and therefore, we will   have him  one potassium pill at home to take with at lunchtime.    IMPRESSION:  1.  Paroxysmal atrial fibrillation, recurrent despite antiarrhythmic therapy.  2.  History of gastroesophageal reflux disease.  3.  Chronic  anticoagulation.  4.  Pulmonary vein isolation with Dr. Cristian Guardado on 02/26/2018.    PLAN:  The patient will be discharged home.  He will report to the emergency   room for any of the following; increased temperature 101 or greater, focal   neurological changes, hemoptysis, or any change in the catheter ablation sites   with drainage, redness, or concerns.  Patient will remove the dressings   tomorrow in the shower.  He will be seen in the office in followup for review   post-repeat ablation.       ____________________________________     KARIE Melendez / GÓMEZ    DD:  02/27/2018 09:43:55  DT:  02/27/2018 10:12:50    D#:  [id number]  Job#:  [id number]

## 2018-02-27 NOTE — PROGRESS NOTES
2 RN skin assessment completed with Taniya GRESHAM. Pt has Bilateral groin cath sites and right wrist, all are clean dry intact and soft. Pt has some redness on his sacrum but is blanching. Pt has some moles on his back but no other breakdown noted at this time.

## 2018-02-27 NOTE — DISCHARGE INSTRUCTIONS
Discharge Instructions    Discharged to home by car with friend. Discharged via walking, hospital escort: Refused.  Special equipment needed: Not Applicable    Be sure to schedule a follow-up appointment with your primary care doctor or any specialists as instructed.     Discharge Plan:   Influenza Vaccine Indication: Not indicated: Previously immunized this influenza season and > 8 years of age    I understand that a diet low in cholesterol, fat, and sodium is recommended for good health. Unless I have been given specific instructions below for another diet, I accept this instruction as my diet prescription.   Other diet: cardiac    Special Instructions: none    · Is patient discharged on Warfarin / Coumadin?   No     Depression / Suicide Risk    As you are discharged from this [facility] facility, it is important to learn how to keep safe from harming yourself.    Recognize the warning signs:  · Abrupt changes in personality, positive or negative- including increase in energy   · Giving away possessions  · Change in eating patterns- significant weight changes-  positive or negative  · Change in sleeping patterns- unable to sleep or sleeping all the time   · Unwillingness or inability to communicate  · Depression  · Unusual sadness, discouragement and loneliness  · Talk of wanting to die  · Neglect of personal appearance   · Rebelliousness- reckless behavior  · Withdrawal from people/activities they love  · Confusion- inability to concentrate    If you or a loved one observes any of these behaviors or has concerns about self-harm, here's what you can do:  · Talk about it- your feelings and reasons for harming yourself  · Remove any means that you might use to hurt yourself (examples: pills, rope, extension cords, firearm)  · Get professional help from the community (Mental Health, Substance Abuse, psychological counseling)  · Do not be alone:Call your Safe Contact- someone whom you trust who will be there for  you.  · Call your local CRISIS HOTLINE 894-6812 or 608-049-9233  · Call your local Children's Mobile Crisis Response Team [place] (964) 437-2069 or www.[employer]  · Call the toll free National Suicide Prevention Hotlines   · National Suicide Prevention Lifeline 041-479-UZFM (7772)  · National Hope Line Network 800-SUICIDE (231-0586)                 Electrical Cardioversion, Care After  Refer to this sheet in the next few weeks. These instructions provide you with information on caring for yourself after your procedure. Your health care provider may also give you more specific instructions. Your treatment has been planned according to current medical practices, but problems sometimes occur. Call your health care provider if you have any problems or questions after your procedure.  WHAT TO EXPECT AFTER THE PROCEDURE  After your procedure, it is typical to have the following sensations:  · Some redness on the skin where the shocks were delivered. If this is tender, a sunburn lotion or hydrocortisone cream may help.  · Possible return of an abnormal heart rhythm within hours or days after the procedure.  HOME CARE INSTRUCTIONS  · Take medicines only as directed by your health care provider. Be sure you understand how and when to take your medicine.  · Learn how to feel your pulse and check it often.  · Limit your activity for 48 hours after the procedure or as directed by your health care provider.  · Avoid or minimize caffeine and other stimulants as directed by your health care provider.  SEEK MEDICAL CARE IF:  · You feel like your heart is beating too fast or your pulse is not regular.  · You have any questions about your medicines.  · You have bleeding that will not stop.  SEEK IMMEDIATE MEDICAL CARE IF:  · You are dizzy or feel faint.  · It is hard to breathe or you feel short of breath.  · There is a change in discomfort in your chest.  · Your speech is slurred or you have trouble moving an arm or leg  on one side of your body.  · You get a serious muscle cramp that does not go away.  · Your fingers or toes turn cold or blue.     This information is not intended to replace advice given to you by your health care provider. Make sure you discuss any questions you have with your health care provider.     Document Released: 10/08/2014 Document Revised: 01/08/2016 Document Reviewed: 10/08/2014  [employer] Interactive Patient Education ©2016 [employer]          Atrial Fibrillation  Atrial fibrillation is a type of irregular heart rhythm (arrhythmia). During atrial fibrillation, the upper chambers of the heart (atria) quiver continuously in a chaotic pattern. This causes an irregular and often rapid heart rate.   Atrial fibrillation is the result of the heart becoming overloaded with disorganized signals that tell it to beat. These signals are normally released one at a time by a part of the right atrium called the sinoatrial node. They then travel from the atria to the lower chambers of the heart (ventricles), causing the atria and ventricles to contract and pump blood as they pass. In atrial fibrillation, parts of the atria outside of the sinoatrial node also release these signals. This results in two problems. First, the atria receive so many signals that they do not have time to fully contract. Second, the ventricles, which can only receive one signal at a time, beat irregularly and out of rhythm with the atria.   There are three types of atrial fibrillation:   · Paroxysmal. Paroxysmal atrial fibrillation starts suddenly and stops on its own within a week.  · Persistent. Persistent atrial fibrillation lasts for more than a week. It may stop on its own or with treatment.  · Permanent. Permanent atrial fibrillation does not go away. Episodes of atrial fibrillation may lead to permanent atrial fibrillation.  Atrial fibrillation can prevent your heart from pumping blood normally. It increases your risk of stroke and can  lead to heart failure.   CAUSES   · Heart conditions, including a heart attack, heart failure, coronary artery disease, and heart valve conditions.    · Inflammation of the sac that surrounds the heart (pericarditis).  · Blockage of an artery in the lungs (pulmonary embolism).  · Pneumonia or other infections.  · Chronic lung disease.  · Thyroid problems, especially if the thyroid is overactive (hyperthyroidism).  · Caffeine, excessive alcohol use, and use of some illegal drugs.    · Use of some medicines, including certain decongestants and diet pills.  · Heart surgery.    · Birth defects.    Sometimes, no cause can be found. When this happens, the atrial fibrillation is called lone atrial fibrillation. The risk of complications from atrial fibrillation increases if you have lone atrial fibrillation and you are age 60 years or older.  RISK FACTORS  · Heart failure.  · Coronary artery disease.  · Diabetes mellitus.    · High blood pressure (hypertension).    · Obesity.    · Other arrhythmias.    · Increased age.  SIGNS AND SYMPTOMS   · A feeling that your heart is beating rapidly or irregularly.    · A feeling of discomfort or pain in your chest.    · Shortness of breath.    · Sudden light-headedness or weakness.    · Getting tired easily when exercising.    · Urinating more often than normal (mainly when atrial fibrillation first begins).    In paroxysmal atrial fibrillation, symptoms may start and suddenly stop.  DIAGNOSIS   Your health care provider may be able to detect atrial fibrillation when taking your pulse. Your health care provider may have you take a test called an ambulatory electrocardiogram (ECG). An ECG records your heartbeat patterns over a 24-hour period. You may also have other tests, such as:  · Transthoracic echocardiogram (TTE). During echocardiography, sound waves are used to evaluate how blood flows through your heart.  · Transesophageal echocardiogram (RANJAN).  · Stress test. There is more  than one type of stress test. If a stress test is needed, ask your health care provider about which type is best for you.  · Chest X-ray exam.  · Blood tests.  · Computed tomography (CT).  TREATMENT   Treatment may include:  · Treating any underlying conditions. For example, if you have an overactive thyroid, treating the condition may correct atrial fibrillation.  · Taking medicine. Medicines may be given to control a rapid heart rate or to prevent blood clots, heart failure, or a stroke.  · Having a procedure to correct the rhythm of the heart:  ¨ Electrical cardioversion. During electrical cardioversion, a controlled, low-energy shock is delivered to the heart through your skin. If you have chest pain, very low blood pressure, or sudden heart failure, this procedure may need to be done as an emergency.  ¨ Catheter ablation. During this procedure, heart tissues that send the signals that cause atrial fibrillation are destroyed.  ¨ Surgical ablation. During this surgery, thin lines of heart tissue that carry the abnormal signals are destroyed. This procedure can either be an open-heart surgery or a minimally invasive surgery. With the minimally invasive surgery, small cuts are made to access the heart instead of a large opening.  ¨ Pulmonary venous isolation. During this surgery, tissue around the veins that carry blood from the lungs (pulmonary veins) is destroyed. This tissue is thought to carry the abnormal signals.  HOME CARE INSTRUCTIONS   · Take medicines only as directed by your health care provider. Some medicines can make atrial fibrillation worse or recur.  · If blood thinners were prescribed by your health care provider, take them exactly as directed. Too much blood-thinning medicine can cause bleeding. If you take too little, you will not have the needed protection against stroke and other problems.  · Perform blood tests at home if directed by your health care provider. Perform blood tests exactly as  directed.  · Quit smoking if you smoke.  · Do not drink alcohol.  · Do not drink caffeinated beverages such as coffee, soda, and some teas. You may drink decaffeinated coffee, soda, or tea.    · Maintain a healthy weight. Do not use diet pills unless your health care provider approves. They may make heart problems worse.    · Follow diet instructions as directed by your health care provider.  · Exercise regularly as directed by your health care provider.  · Keep all follow-up visits as directed by your health care provider. This is important.  PREVENTION   The following substances can cause atrial fibrillation to recur:   · Caffeinated beverages.  · Alcohol.  · Certain medicines, especially those used for breathing problems.  · Certain herbs and herbal medicines, such as those containing ephedra or ginseng.  · Illegal drugs, such as cocaine and amphetamines.  Sometimes medicines are given to prevent atrial fibrillation from recurring. Proper treatment of any underlying condition is also important in helping prevent recurrence.   SEEK MEDICAL CARE IF:  · You notice a change in the rate, rhythm, or strength of your heartbeat.  · You suddenly begin urinating more frequently.  · You tire more easily when exerting yourself or exercising.  SEEK IMMEDIATE MEDICAL CARE IF:   · You have chest pain, abdominal pain, sweating, or weakness.  · You feel nauseous.  · You have shortness of breath.  · You suddenly have swollen feet and ankles.  · You feel dizzy.  · Your face or limbs feel numb or weak.  · You have a change in your vision or speech.  MAKE SURE YOU:   · Understand these instructions.  · Will watch your condition.  · Will get help right away if you are not doing well or get worse.     This information is not intended to replace advice given to you by your health care provider. Make sure you discuss any questions you have with your health care provider.     Document Released: 12/18/2006 Document Revised: 01/08/2016  Document Reviewed: 04/13/2016  [employer] Interactive Patient Education ©2016 [employer]

## 2018-02-27 NOTE — OR NURSING
1745 patient arrived from cath lab s/p a.fib ablation, patient aaox4, bilateral groin dressing clean, artline right radial dressing clean, patient denies pain, s.o.b, n/v, n/t, vss, plan of care discussed with patient and family agreeable  1800 artline discontinued tip intact, gauze tegaderm and pressure dressing applied.  1820 patient tolerating oral fluids and snacks.  1922 report given to MP lara T8 bed 2 all questions answered.  1930 patient transported to room T814 with all his personal belongings  1936 checked bilateral groin dressing with MP lara no bleeding no hematoma dressing clean dry intact.

## 2018-02-27 NOTE — PROGRESS NOTES
Lab called about Hgb drop from 17 down to 14. Pt has no signs of bleeding, vitals and other labs are stable. Will continue to monitor

## 2018-02-27 NOTE — CATH LAB
Electrophysiology Procedure Note    Indication:PAF    Procedure Performed: 1. Atrial fibrillation ablation 2. Advanced mapping using CARTO system 3. Frequent ACT's   4. Transesophageal echocardiography 5. Intracardiac echocardiography 6. Esophageal temperature monitoring 7. Intraarterial blood pressure monitoring     Physician(s): CONCEPCIÓN Guardado M.D.     Resident/Assistant(s): None     Anesthesia: General endotracheal anesthetic.    Anaesthesiologist: Dr Pritchard    Specimen(s) Removed: None     Estimated Blood Loss:  30cc     Complications:  None    Pre-procedure ECG: SR    Post Procedure ECG: SR    Description of Procedure:   After informed written consent, the patient was brought to the EP lab in the fasting, non-sedated state. The patient was prepped and draped in the usual sterile fashion.  RANJAN showed no evidence of LA clot. Arterial line placed for monitoring. Esophageal temperature probe placed and monitored.  Femoral venous access was obtained using the modified Seldinger technique. In the left femoral vein, 3 sheaths (6,8,8 Fr) were inserted over 0.35” guidewires. In the right femoral vein, 1 sheaths (10.5 Fr) were inserted over 0.35” guidewires. A deflectable decapolar catheter was advanced to the CS position. An intracardiac echo (ICE) catheter was advanced to the right atrium. ICE was used to identify the atrial septum, left atrial appendage, and pulmonary veins and daren the anatomic structures to superimpose on our 3D electroanatomic map using CARTOSound. During the procedure, ICE was utilized to localize the ablation catheter, monitor for thrombus formation, and to exclude pericardial effusion. Intravenous heparin was administered to maintain -350 seconds. Double transseptal left heart catheterization was performed under intracardiac echo and hemodynamic guidance. A Moca needle was inserted into the two 8 Fr sheaths (SL1) for transseptal puncture and placement of sheaths in the left atrium. Mapping of  the pulmonary veins and left atrium was performed using CARTO3 [employer] ST/SF D/F and a deflectable multipolar mapping catheter ([employer] NetTV PentaRay). Wide antral circumferential ablation was performed using an 3.5 mm deflectable irrigated force contact catheter (Mobile Complete ST/SF) at primarily 25 W (posteriorly) to 35 W ( anteriorly)  power starting from the L sided veins and then proceeding along to the RSPV, RMPV and then finally the RIPV. Bidirectional pulmonary vein antrum isolation was verified at the end of the procedure by pacing inside the vein and confirming exit block along with absence of local PV signals on the PentaRay. During the procedure the patient went into atrial fib which was driven by the RUPV which was in a fib. Following isolation of the RUPV, the vein continued in a fib but the patient went back into SR. Multiple times the RUPV had to be reablated to prevent reconduction and clinical a fib. Final ablation provided sustained block. The LUPV was also somewhat difficult to isolate and isolated at anterior amna. At the end of the procedure, heparin was reversed with protamine, the catheter and sheaths were removed, and hemostasis was achieved by manual compression. Following recovery from anesthesia, the patient was transferred to the PPU.     Total ablation time: 3776 seconds    Fluoroscopy time: 6.1 minutes     Electrophysiologic Findings:    1. Baseline: SR  873 ms,  ms, HV 42 ms.   2. Final   ms    Impressions:    1. Paroxysmal atrial fibrillation coming from a fib in RUPV   2. Successful RF ablation pulmonary vein isolation procedure.       Recommendations:  1. Resume anticoagulation.  2. Admit to monitored bedrest.

## 2018-02-27 NOTE — CATH LAB
Immediate Post-Operative Note    PreOp Diagnosis: PAF    PostOp Diagnosis: PVI  A fib from RUPV    Procedure(s) :  EPS with RFA    Surgeon(s):  Cristian Guardado M.D.    Type of Anesthesia: General;    Specimen: None    Estimated Blood Loss: 20 cc's    Findings: SR to a fib coming from RUPV, isolation of all veins with termination of a fib    Complications: none      Cristian Guardado M.D.  2/26/2018 5:13 PM

## 2018-02-27 NOTE — PROGRESS NOTES
Pt brought up from PPU, report received. Pt is A&Ox4, on 2L NC, no complaints of pain at this time. Pt in bed, bed locked in low position, bed alarm is on, tele box placed, monitor room and charge notified. Assessment completed. Plan of care discussed. Call light and personal possessions in reach. All needs met at this time. Hourly rounding in place.

## 2018-02-28 ENCOUNTER — TELEPHONE (OUTPATIENT)
Dept: CARDIOLOGY | Facility: MEDICAL CENTER | Age: 78
End: 2018-02-28

## 2018-02-28 NOTE — TELEPHONE ENCOUNTER
Pt advised as below. Does not feel like he needs CV for now but if becomes intolerante will go to [facility] for CV, will keep 3/14 appt PB

## 2018-02-28 NOTE — TELEPHONE ENCOUNTER
"----- Message from Lisa Miramontes sent at 2/28/2018  8:01 AM PST -----  Regarding: heart out of sync  Contact: [phone]  TERESA/shilpa    Pt calling to state his \"heart is out of sync\" following his ablation.    Please call Sherwin: [phone]  "

## 2018-02-28 NOTE — TELEPHONE ENCOUNTER
"Pt had ablation Monday, Went into Afib this am. Rate 105-140, \"all over the place\", He was discharged on eliquis but not sotalol, he took sotalol 120 this am. He is tolerating it fine.     To DS to advise if should restart sotalol.   "

## 2018-03-05 ENCOUNTER — TELEPHONE (OUTPATIENT)
Dept: CARDIOLOGY | Facility: MEDICAL CENTER | Age: 78
End: 2018-03-05

## 2018-03-05 NOTE — TELEPHONE ENCOUNTER
Pt does not want to come to [place] for ablation fu, advised it would be better for ablation [place] but can be seen in [place] in drive is difficult, he has been transferred to scheduling to change appt per his request.

## 2018-03-15 ENCOUNTER — OFFICE VISIT (OUTPATIENT)
Dept: CARDIOLOGY | Facility: CLINIC | Age: 78
End: 2018-03-15
Payer: MEDICARE

## 2018-03-15 VITALS
HEIGHT: 72 IN | HEART RATE: 69 BPM | OXYGEN SATURATION: 96 % | WEIGHT: 185 LBS | SYSTOLIC BLOOD PRESSURE: 130 MMHG | DIASTOLIC BLOOD PRESSURE: 60 MMHG | BODY MASS INDEX: 25.06 KG/M2

## 2018-03-15 DIAGNOSIS — Z79.01 ANTICOAGULANT LONG-TERM USE: ICD-10-CM

## 2018-03-15 DIAGNOSIS — I48.0 PAROXYSMAL ATRIAL FIBRILLATION (HCC): ICD-10-CM

## 2018-03-15 DIAGNOSIS — E78.5 DYSLIPIDEMIA: ICD-10-CM

## 2018-03-15 DIAGNOSIS — Z51.81 ENCOUNTER FOR MONITORING SOTALOL THERAPY: ICD-10-CM

## 2018-03-15 DIAGNOSIS — I48.91 ATRIAL FIBRILLATION, UNSPECIFIED TYPE (HCC): ICD-10-CM

## 2018-03-15 DIAGNOSIS — I25.10 ATHEROSCLEROSIS OF NATIVE CORONARY ARTERY OF NATIVE HEART WITHOUT ANGINA PECTORIS: ICD-10-CM

## 2018-03-15 DIAGNOSIS — Z79.899 ENCOUNTER FOR MONITORING SOTALOL THERAPY: ICD-10-CM

## 2018-03-15 PROCEDURE — 99214 OFFICE O/P EST MOD 30 MIN: CPT | Performed by: INTERNAL MEDICINE

## 2018-03-15 PROCEDURE — 93000 ELECTROCARDIOGRAM COMPLETE: CPT | Performed by: INTERNAL MEDICINE

## 2018-03-15 ASSESSMENT — ENCOUNTER SYMPTOMS
NAUSEA: 0
BLURRED VISION: 0
WEAKNESS: 0
CLAUDICATION: 0
SHORTNESS OF BREATH: 0
COUGH: 0
BRUISES/BLEEDS EASILY: 0
FOCAL WEAKNESS: 0
SORE THROAT: 0
PND: 0
ABDOMINAL PAIN: 0
DIZZINESS: 0
FEVER: 0
PALPITATIONS: 0
CHILLS: 0
FALLS: 0

## 2018-03-15 NOTE — LETTER
[facility] [address],   [address]nd [place] [address]  Phone: [phone]  Fax: [phone]              Sherwin Hennessy  1940    Encounter Date: 3/15/2018    Thom Gudino M.D.          PROGRESS NOTE:  Subjective:   Sherwin Hennessy is a 77 y.o. male who presents today for follow-up of his history paroxysmal atrial fibrillation having had recent ablation he did have recurrence of A. fib shortly thereafter off of sotalol and restarted    Is frustrated by the need to take ongoing medications. Understands the importance    No further A. fib back on sotalol a last about 4 days    Past Medical History:   Diagnosis Date   • Arthritis    • BPH (benign prostatic hyperplasia)    • CAD (coronary artery disease)    • Cataract     bilat IOL   • Elevated PSA    • Heart burn    • Hiatal hernia    • High cholesterol    • Hypertension    • Neuropathy (CMS-HCC)    • PAF (paroxysmal atrial fibrillation) (CMS-HCC)    • Stroke (CMS-HCC)     tia x3     Past Surgical History:   Procedure Laterality Date   • EYE SURGERY Bilateral     cataract sx   • INGUINAL HERNIA REPAIR     • OTHER ABDOMINAL SURGERY      lap kortney   • VASECTOMY       Family History   Problem Relation Age of Onset   • Hypertension Mother    • Heart Disease Father    • Cancer Sister    • Cancer Brother    • Arthritis Other    • Diabetes Other      History   Smoking Status   • Never Smoker   Smokeless Tobacco   • Never Used     Allergies   Allergen Reactions   • Amiodarone Unspecified     Felt like he was going to die   • Food Hives     Watermelon, cantaloupe, strawberries, apples     Outpatient Encounter Prescriptions as of 3/15/2018   Medication Sig Dispense Refill   • sotalol (BETAPACE) 120 MG tablet Take 1 Tab by mouth 2 times a day. 60 Tab 11   • tramadol (ULTRAM) 50 MG Tab Take 1 Tab by mouth every 8 hours as needed for Moderate Pain or Severe Pain for up to 90 days. 180  Tab 0   • omeprazole (PRILOSEC) 20 MG delayed-release capsule Take 1 Cap by mouth every day. 30 Cap 0   • gabapentin (NEURONTIN) 300 MG Cap Take 1 Cap by mouth every day. (Patient taking differently: Take 300 mg by mouth every evening.) 90 Cap 1   • atorvastatin (LIPITOR) 20 MG Tab Take 1 Tab by mouth every bedtime. 90 Tab 3   • hydrochlorothiazide (HYDRODIURIL) 12.5 MG tablet Take 1 Tab by mouth every day. 90 Tab 3   • terazosin (HYTRIN) 5 MG Cap Take 1 Cap by mouth every day. (Patient taking differently: Take 5 mg by mouth every evening.) 90 Cap 3   • apixaban (ELIQUIS) 5mg Tab Take 1 Tab by mouth 2 Times a Day. 180 Tab 3   • Cyanocobalamin (VITAMIN B-12 PO) Take 2,000 mcg by mouth every day.     • predniSONE (DELTASONE) 5 MG Tab Take 1 Tab by mouth as needed. 30 Tab 0     No facility-administered encounter medications on file as of 3/15/2018.      Review of Systems   Constitutional: Negative for chills and fever.   HENT: Negative for sore throat.    Eyes: Negative for blurred vision.   Respiratory: Negative for cough and shortness of breath.    Cardiovascular: Negative for chest pain, palpitations, claudication, leg swelling and PND.   Gastrointestinal: Negative for abdominal pain and nausea.   Musculoskeletal: Negative for falls and joint pain.   Skin: Negative for rash.   Neurological: Negative for dizziness, focal weakness and weakness.   Endo/Heme/Allergies: Does not bruise/bleed easily.        Objective:   /60   Pulse 69   Ht 1.829 m (6')   Wt 83.9 kg (185 lb)   SpO2 96%   BMI 25.09 kg/m²      Physical Exam   Constitutional: No distress.   HENT:   Mouth/Throat: Oropharynx is clear and moist.   Eyes: No scleral icterus.   Neck: Neck supple. No JVD present.   Cardiovascular: Normal rate, regular rhythm, normal heart sounds and intact distal pulses.  Exam reveals no gallop and no friction rub.    No murmur heard.  Pulmonary/Chest: Effort normal. He has no rales.   Abdominal: Soft. Bowel sounds are  normal. There is no tenderness.   Musculoskeletal: He exhibits no edema.   Neurological: He is alert.   Skin: No rash noted. He is not diaphoretic.   Psychiatric: He has a normal mood and affect.       Assessment:     1. Atrial fibrillation, unspecified type (CMS-HCC)  EKG   2. Paroxysmal atrial fibrillation (CMS-HCC)     3. Dyslipidemia     4. Anticoagulant long-term use     5. Encounter for monitoring sotalol therapy  BASIC METABOLIC PANEL   6. Atherosclerosis of native coronary artery of native heart without angina pectoris         Medical Decision Making:  Today's Assessment / Status / Plan:     It was my pleasure to meet with Mr. Hennessy.    I encouraged him to increase his dietary potassium will follow up on the level    I reinforced that given his paroxysmal atrial fibrillation persisted after ablation he could do another ablation but otherwise continue on long-term rhythm control and anticoagulation he is in agreement    Blood pressure is well controlled    I will see Mr. Hennessy back in 6 months time and encouraged him to follow up with us over the phone or e-mail using my MyChart as issues arise.    It is my pleasure to participate in the care of Mr. Hennessy.  Please do not hesitate to contact me with questions or concerns.    Thom Gudino MD PhD [facility]  Cardiologist [facility]        Dwayne Min M.D.  [address] [facility] [address]  [place] [address]  VIA Facsimile: [phone]

## 2018-03-15 NOTE — PROGRESS NOTES
Subjective:   Sherwin Hennessy is a 77 y.o. male who presents today for follow-up of his history paroxysmal atrial fibrillation having had recent ablation he did have recurrence of A. fib shortly thereafter off of sotalol and restarted    Is frustrated by the need to take ongoing medications. Understands the importance    No further A. fib back on sotalol a last about 4 days    Past Medical History:   Diagnosis Date   • Arthritis    • BPH (benign prostatic hyperplasia)    • CAD (coronary artery disease)    • Cataract     bilat IOL   • Elevated PSA    • Heart burn    • Hiatal hernia    • High cholesterol    • Hypertension    • Neuropathy (CMS-HCC)    • PAF (paroxysmal atrial fibrillation) (CMS-HCC)    • Stroke (CMS-HCC)     tia x3     Past Surgical History:   Procedure Laterality Date   • EYE SURGERY Bilateral     cataract sx   • INGUINAL HERNIA REPAIR     • OTHER ABDOMINAL SURGERY      lap kortney   • VASECTOMY       Family History   Problem Relation Age of Onset   • Hypertension Mother    • Heart Disease Father    • Cancer Sister    • Cancer Brother    • Arthritis Other    • Diabetes Other      History   Smoking Status   • Never Smoker   Smokeless Tobacco   • Never Used     Allergies   Allergen Reactions   • Amiodarone Unspecified     Felt like he was going to die   • Food Hives     Watermelon, cantaloupe, strawberries, apples     Outpatient Encounter Prescriptions as of 3/15/2018   Medication Sig Dispense Refill   • sotalol (BETAPACE) 120 MG tablet Take 1 Tab by mouth 2 times a day. 60 Tab 11   • tramadol (ULTRAM) 50 MG Tab Take 1 Tab by mouth every 8 hours as needed for Moderate Pain or Severe Pain for up to 90 days. 180 Tab 0   • omeprazole (PRILOSEC) 20 MG delayed-release capsule Take 1 Cap by mouth every day. 30 Cap 0   • gabapentin (NEURONTIN) 300 MG Cap Take 1 Cap by mouth every day. (Patient taking differently: Take 300 mg by mouth every evening.) 90 Cap 1   • atorvastatin (LIPITOR) 20 MG Tab Take 1 Tab by  mouth every bedtime. 90 Tab 3   • hydrochlorothiazide (HYDRODIURIL) 12.5 MG tablet Take 1 Tab by mouth every day. 90 Tab 3   • terazosin (HYTRIN) 5 MG Cap Take 1 Cap by mouth every day. (Patient taking differently: Take 5 mg by mouth every evening.) 90 Cap 3   • apixaban (ELIQUIS) 5mg Tab Take 1 Tab by mouth 2 Times a Day. 180 Tab 3   • Cyanocobalamin (VITAMIN B-12 PO) Take 2,000 mcg by mouth every day.     • predniSONE (DELTASONE) 5 MG Tab Take 1 Tab by mouth as needed. 30 Tab 0     No facility-administered encounter medications on file as of 3/15/2018.      Review of Systems   Constitutional: Negative for chills and fever.   HENT: Negative for sore throat.    Eyes: Negative for blurred vision.   Respiratory: Negative for cough and shortness of breath.    Cardiovascular: Negative for chest pain, palpitations, claudication, leg swelling and PND.   Gastrointestinal: Negative for abdominal pain and nausea.   Musculoskeletal: Negative for falls and joint pain.   Skin: Negative for rash.   Neurological: Negative for dizziness, focal weakness and weakness.   Endo/Heme/Allergies: Does not bruise/bleed easily.        Objective:   /60   Pulse 69   Ht 1.829 m (6')   Wt 83.9 kg (185 lb)   SpO2 96%   BMI 25.09 kg/m²     Physical Exam   Constitutional: No distress.   HENT:   Mouth/Throat: Oropharynx is clear and moist.   Eyes: No scleral icterus.   Neck: Neck supple. No JVD present.   Cardiovascular: Normal rate, regular rhythm, normal heart sounds and intact distal pulses.  Exam reveals no gallop and no friction rub.    No murmur heard.  Pulmonary/Chest: Effort normal. He has no rales.   Abdominal: Soft. Bowel sounds are normal. There is no tenderness.   Musculoskeletal: He exhibits no edema.   Neurological: He is alert.   Skin: No rash noted. He is not diaphoretic.   Psychiatric: He has a normal mood and affect.       Assessment:     1. Atrial fibrillation, unspecified type (CMS-HCC)  EKG   2. Paroxysmal atrial  fibrillation (CMS-HCC)     3. Dyslipidemia     4. Anticoagulant long-term use     5. Encounter for monitoring sotalol therapy  BASIC METABOLIC PANEL   6. Atherosclerosis of native coronary artery of native heart without angina pectoris         Medical Decision Making:  Today's Assessment / Status / Plan:     It was my pleasure to meet with Mr. Hennessy.    I encouraged him to increase his dietary potassium will follow up on the level    I reinforced that given his paroxysmal atrial fibrillation persisted after ablation he could do another ablation but otherwise continue on long-term rhythm control and anticoagulation he is in agreement    Blood pressure is well controlled    I will see Mr. Hennessy back in 6 months time and encouraged him to follow up with us over the phone or e-mail using my MyChart as issues arise.    It is my pleasure to participate in the care of Mr. Hennessy.  Please do not hesitate to contact me with questions or concerns.    Thom Gudino MD PhD FACC  Cardiologist [facility]

## 2018-03-15 NOTE — PATIENT INSTRUCTIONS
Please work on increasing these foods in your diet to increase your potassium levels    Highest potassium foods  Dried figs, molasses, seaweed    High potassium foods  Dried fruits (dates, prunes), nuts, avocados, bran cereal, wheat germ, lima beans    Potassium-rich food  Vegetables-spinach, tomatoes, broccoli, winter squash, beets, carrots, cauliflower, potatoes    Fruits-bananas, cantaloupe, kiwis, oranges, mangoes    Meats-ground beef, steak, pork, veal, lamb    *Adapted: Adina ALEXIS. Hypokalemia. [place] Journal of Medicine 1998; 339:451.

## 2018-03-18 LAB — EKG IMPRESSION: NORMAL

## 2018-03-19 ENCOUNTER — TELEPHONE (OUTPATIENT)
Dept: CARDIOLOGY | Facility: MEDICAL CENTER | Age: 78
End: 2018-03-19

## 2018-03-19 NOTE — TELEPHONE ENCOUNTER
Spoke with pt about post ablation sx and recurrence of palp/fib. He has been doing well since sotalol restarted. Pt c/o soreness inside right thigh when he runs his hand on it. He just wanted to make sure it was not a blood clot, denies pain with ambulation, no red or hot spots, no bruising, no other sx unless he touches it. Ep fu made with PB 5/2.      To DS to advise

## 2018-03-19 NOTE — TELEPHONE ENCOUNTER
----- Message from Nicholas Barcenas sent at 3/19/2018  2:06 PM PDT -----  Regarding: Pt has question's post ablation with DS  Contact: [phone]  TERESA/Claire     Pt has some question's post ablation 2/26 with DS. He would please like a call back at [phone].

## 2018-03-20 NOTE — TELEPHONE ENCOUNTER
Unlikely to be a blood clot since on eliquis. If painful and swollen then it should be check by us

## 2018-03-20 NOTE — TELEPHONE ENCOUNTER
S/W Pt and he reports that today it feels much better, pain is better he thinks it might be muscle related. Pt will let us know if it gets painful and swollen, Pt thankful for call.  Neisha ONEAL RN

## 2018-05-02 ENCOUNTER — OFFICE VISIT (OUTPATIENT)
Dept: CARDIOLOGY | Facility: MEDICAL CENTER | Age: 78
End: 2018-05-02
Payer: MEDICARE

## 2018-05-02 VITALS
OXYGEN SATURATION: 94 % | WEIGHT: 187 LBS | BODY MASS INDEX: 25.33 KG/M2 | HEIGHT: 72 IN | DIASTOLIC BLOOD PRESSURE: 64 MMHG | SYSTOLIC BLOOD PRESSURE: 128 MMHG | HEART RATE: 83 BPM

## 2018-05-02 DIAGNOSIS — Z51.81 ENCOUNTER FOR MONITORING SOTALOL THERAPY: ICD-10-CM

## 2018-05-02 DIAGNOSIS — Z79.899 ENCOUNTER FOR MONITORING SOTALOL THERAPY: ICD-10-CM

## 2018-05-02 DIAGNOSIS — I48.0 PAF (PAROXYSMAL ATRIAL FIBRILLATION) (HCC): ICD-10-CM

## 2018-05-02 DIAGNOSIS — I48.0 PAROXYSMAL ATRIAL FIBRILLATION (HCC): Chronic | ICD-10-CM

## 2018-05-02 PROCEDURE — 99214 OFFICE O/P EST MOD 30 MIN: CPT | Performed by: NURSE PRACTITIONER

## 2018-05-02 PROCEDURE — 93000 ELECTROCARDIOGRAM COMPLETE: CPT | Performed by: NURSE PRACTITIONER

## 2018-05-02 RX ORDER — SOTALOL HYDROCHLORIDE 80 MG/1
80 TABLET ORAL 2 TIMES DAILY
Qty: 60 TAB | Refills: 3 | Status: SHIPPED | OUTPATIENT
Start: 2018-05-02 | End: 2018-08-29 | Stop reason: SDUPTHER

## 2018-05-02 ASSESSMENT — ENCOUNTER SYMPTOMS
CHILLS: 0
WHEEZING: 0
DOUBLE VISION: 0
MUSCULOSKELETAL NEGATIVE: 1
WEIGHT LOSS: 0
SPUTUM PRODUCTION: 0
HEARTBURN: 0
SORE THROAT: 0
FEVER: 0
SPEECH CHANGE: 0
BLURRED VISION: 0
DIZZINESS: 0
SEIZURES: 0
CLAUDICATION: 0
COUGH: 0
VOMITING: 0
PSYCHIATRIC NEGATIVE: 1
FLANK PAIN: 0
HEADACHES: 0
ABDOMINAL PAIN: 0
PND: 0
SHORTNESS OF BREATH: 0
PALPITATIONS: 0
ORTHOPNEA: 0
NAUSEA: 0
LOSS OF CONSCIOUSNESS: 0
FOCAL WEAKNESS: 0

## 2018-05-02 NOTE — PROGRESS NOTES
Chief Complaint   Patient presents with   • Atrial Fibrillation     H/F DX:PAF       Subjective:   Sherwin Hennessy is a 78 y.o. male who presents today for review of his atrial arrhythmias. He underwent PVI on 2/26/18 with Dr Guardado. 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   One day after procedure he went back into AF he was restarted on Sotalol 120 mgs. B.i.d. And he reverted back after a day or so on the medication.  He has maintained SR since that time.  His EKG demonstrates continuation of SR with QTc of 473ms.  He has had no chest pain, sob, temperature elevations or stroke like symptoms.  No bleeding issues continuing on Eliquis.  Past Medical History:   Diagnosis Date   • Arthritis    • BPH (benign prostatic hyperplasia)    • CAD (coronary artery disease)    • Cataract     bilat IOL   • Elevated PSA    • Heart burn    • Hiatal hernia    • High cholesterol    • Hypertension    • Neuropathy (HCC)    • PAF (paroxysmal atrial fibrillation) (HCC)    • Stroke (HCC)     tia x3     Past Surgical History:   Procedure Laterality Date   • EYE SURGERY Bilateral     cataract sx   • INGUINAL HERNIA REPAIR     • OTHER ABDOMINAL SURGERY      lap kortney   • VASECTOMY       Family History   Problem Relation Age of Onset   • Hypertension Mother    • Heart Disease Father    • Cancer Sister    • Cancer Brother    • Arthritis Other    • Diabetes Other      Social History     Social History   • Marital status: Single     Spouse name: N/A   • Number of children: N/A   • Years of education: N/A     Occupational History   • Retired      Social History Main Topics   • Smoking status: Never Smoker   • Smokeless tobacco: Never Used   • Alcohol use No      Comment: former etoh   • Drug use: No   • Sexual activity: Not on file     Other Topics Concern   • Not on file     Social History Narrative   • No narrative on file     Allergies   Allergen Reactions   • Amiodarone Unspecified     Felt  like he was going to die   • Food Hives     Watermelon, cantaloupe, strawberries, apples     Outpatient Encounter Prescriptions as of 5/2/2018   Medication Sig Dispense Refill   • sotalol (BETAPACE) 80 MG Tab Take 1 Tab by mouth 2 times a day. 60 Tab 3   • predniSONE (DELTASONE) 5 MG Tab TAKE ONE TABLET BY MOUTH DAILY AS NEEDED FOR FLARES OF OSTEOARTHRITIS - MAX 1 PER DAY- 30 Tab 0   • ELIQUIS 5 MG Tab TAKE ONE TABLET BY MOUTH TWICE A  Tab 2   • terazosin (HYTRIN) 5 MG Cap TAKE ONE CAPSULE BY MOUTH DAILY (GENERIC FOR HYTRIN) 90 Cap 3   • tramadol (ULTRAM) 50 MG Tab Take 1 Tab by mouth every 8 hours as needed for Moderate Pain or Severe Pain for up to 90 days. 180 Tab 0   • omeprazole (PRILOSEC) 20 MG delayed-release capsule Take 1 Cap by mouth every day. 30 Cap 0   • gabapentin (NEURONTIN) 300 MG Cap Take 1 Cap by mouth every day. (Patient taking differently: Take 300 mg by mouth every evening.) 90 Cap 1   • atorvastatin (LIPITOR) 20 MG Tab Take 1 Tab by mouth every bedtime. 90 Tab 3   • hydrochlorothiazide (HYDRODIURIL) 12.5 MG tablet Take 1 Tab by mouth every day. 90 Tab 3   • Cyanocobalamin (VITAMIN B-12 PO) Take 2,000 mcg by mouth every day.     • [DISCONTINUED] sotalol (BETAPACE) 120 MG tablet Take 1 Tab by mouth 2 times a day. 60 Tab 11     No facility-administered encounter medications on file as of 5/2/2018.      Review of Systems   Constitutional: Negative for chills, fever, malaise/fatigue and weight loss.   HENT: Negative for congestion and sore throat.    Eyes: Negative for blurred vision and double vision.   Respiratory: Negative for cough, sputum production, shortness of breath and wheezing.    Cardiovascular: Negative for chest pain, palpitations, orthopnea, claudication, leg swelling and PND.   Gastrointestinal: Negative for abdominal pain, heartburn, nausea and vomiting.   Genitourinary: Negative for dysuria, flank pain and frequency.   Musculoskeletal: Negative.    Skin: Negative.   "  Neurological: Negative for dizziness, speech change, focal weakness, seizures, loss of consciousness and headaches.   Endo/Heme/Allergies: Negative.    Psychiatric/Behavioral: Negative.         Objective:   /64   Pulse 83   Ht 1.829 m (6' 0.01\")   Wt 84.8 kg (187 lb)   SpO2 94%   BMI 25.36 kg/m²     Physical Exam   Constitutional: He is oriented to person, place, and time. He appears well-developed and well-nourished.   HENT:   Head: Normocephalic and atraumatic.   Eyes: Pupils are equal, round, and reactive to light.   Neck: Normal range of motion. Neck supple.   Cardiovascular: Normal rate and regular rhythm.    Pulmonary/Chest: Effort normal and breath sounds normal.   Abdominal: Soft. Bowel sounds are normal.   Musculoskeletal: Normal range of motion.   Neurological: He is alert and oriented to person, place, and time.   Skin: Skin is warm and dry.   Psychiatric: He has a normal mood and affect.       Assessment:     1. PAF (paroxysmal atrial fibrillation) ([facility])  EKG   2. Encounter for monitoring sotalol therapy     3. Paroxysmal atrial fibrillation (CMS-[facility]) s/p ablation         Medical Decision Making:  Today's Assessment / Status / Plan:     1. PAF no further recurrence back on Sotalol 120 mgs b.i.d. Since one day post ablation with recurrence of AF.  2. Sotalol  Rx QTc 473 ms stable.  Sinus Rhythm  3. PAF maintaining Sr now 9 weeks post ablation. Will reduce the dose of Sotalol to 80 mgs b.i.d. And see how he does on the lower dose.  RTC 3 months to see Dr Guardado    Collaborating MD Dr ANDERSON"

## 2018-05-02 NOTE — LETTER
[facility]   [address], [facility] [address]  [place] [address]  Phone: [phone]  Fax: [phone]              Sherwin Hennessy  1940    Encounter Date: 5/2/2018    STANLEY Ruasch          PROGRESS NOTE:  No notes on file      Shen Granger M.D.  [address] [place] [address]  VIA In Basket

## 2018-05-04 LAB — EKG IMPRESSION: NORMAL

## 2018-05-08 ENCOUNTER — TELEPHONE (OUTPATIENT)
Dept: CARDIOLOGY | Facility: MEDICAL CENTER | Age: 78
End: 2018-05-08

## 2018-05-08 NOTE — TELEPHONE ENCOUNTER
----- Message from Thom Gudino M.D. sent at 5/8/2018  4:38 PM PDT -----  Labs look good, please let him know     Thank you

## 2018-08-10 ENCOUNTER — OFFICE VISIT (OUTPATIENT)
Dept: CARDIOLOGY | Facility: MEDICAL CENTER | Age: 78
End: 2018-08-10
Payer: MEDICARE

## 2018-08-10 VITALS
OXYGEN SATURATION: 96 % | BODY MASS INDEX: 25.33 KG/M2 | DIASTOLIC BLOOD PRESSURE: 64 MMHG | WEIGHT: 187 LBS | HEIGHT: 72 IN | HEART RATE: 63 BPM | SYSTOLIC BLOOD PRESSURE: 130 MMHG

## 2018-08-10 DIAGNOSIS — I48.0 PAF (PAROXYSMAL ATRIAL FIBRILLATION) (HCC): Primary | ICD-10-CM

## 2018-08-10 DIAGNOSIS — Z86.79 S/P ABLATION OF ATRIAL FIBRILLATION: ICD-10-CM

## 2018-08-10 DIAGNOSIS — Z98.890 S/P ABLATION OF ATRIAL FIBRILLATION: ICD-10-CM

## 2018-08-10 DIAGNOSIS — Z79.899 ENCOUNTER FOR MONITORING SOTALOL THERAPY: ICD-10-CM

## 2018-08-10 DIAGNOSIS — Z79.01 ANTICOAGULANT LONG-TERM USE: ICD-10-CM

## 2018-08-10 DIAGNOSIS — Z51.81 ENCOUNTER FOR MONITORING SOTALOL THERAPY: ICD-10-CM

## 2018-08-10 LAB — EKG IMPRESSION: NORMAL

## 2018-08-10 PROCEDURE — 99214 OFFICE O/P EST MOD 30 MIN: CPT | Performed by: INTERNAL MEDICINE

## 2018-08-10 PROCEDURE — 93000 ELECTROCARDIOGRAM COMPLETE: CPT | Performed by: INTERNAL MEDICINE

## 2018-08-10 RX ORDER — CETIRIZINE HYDROCHLORIDE 10 MG/1
10 TABLET ORAL DAILY
COMMUNITY
End: 2020-07-21

## 2018-08-10 NOTE — PROGRESS NOTES
Chief Complaint   Patient presents with   • Atrial Fibrillation     paf, fv       Subjective:   Sherwin Hennessy is a 78 y.o. male who presents today with previous ablation for paroxysmal atrial fibrillation with a PVI.  Occasional breakthroughs none since June.  Now on sotalol 80 twice a day.  Overall feeling well without any chest pain or shortness of breath.  On Eliquis.  Never a study for sleep apnea.    Past Medical History:   Diagnosis Date   • Arthritis    • BPH (benign prostatic hyperplasia)    • CAD (coronary artery disease)    • Cataract     bilat IOL   • Elevated PSA    • Heart burn    • Hiatal hernia    • High cholesterol    • Hypertension    • Neuropathy (HCC)    • PAF (paroxysmal atrial fibrillation) (HCC)    • Stroke (HCC)     tia x3     Past Surgical History:   Procedure Laterality Date   • EYE SURGERY Bilateral     cataract sx   • INGUINAL HERNIA REPAIR     • OTHER ABDOMINAL SURGERY      lap kortney   • VASECTOMY       Family History   Problem Relation Age of Onset   • Hypertension Mother    • Heart Disease Father    • Cancer Sister    • Cancer Brother    • Arthritis Other    • Diabetes Other      Social History     Social History   • Marital status: Single     Spouse name: N/A   • Number of children: N/A   • Years of education: N/A     Occupational History   • Retired      Social History Main Topics   • Smoking status: Never Smoker   • Smokeless tobacco: Never Used   • Alcohol use No      Comment: former etoh   • Drug use: No   • Sexual activity: Not on file     Other Topics Concern   • Not on file     Social History Narrative   • No narrative on file     Allergies   Allergen Reactions   • Amiodarone Unspecified     Felt like he was going to die   • Food Hives     Watermelon, cantaloupe, strawberries, apples     Outpatient Encounter Prescriptions as of 8/10/2018   Medication Sig Dispense Refill   • cetirizine (ZYRTEC) 10 MG Tab Take 10 mg by mouth every day.     • pravastatin (PRAVACHOL) 20 MG Tab  Take 1 Tab by mouth every day. 90 Tab 1   • hydrochlorothiazide (MICROZIDE) 12.5 MG capsule TAKE ONE CAPSULE BY MOUTH DAILY 90 Cap 2   • gabapentin (NEURONTIN) 300 MG Cap TAKE ONE CAPSULE BY MOUTH DAILY 90 Cap 0   • tramadol (ULTRAM) 50 MG Tab Take 1 Tab by mouth every 8 hours as needed for Moderate Pain or Severe Pain for up to 90 days. 180 Tab 0   • sotalol (BETAPACE) 80 MG Tab Take 1 Tab by mouth 2 times a day. 60 Tab 3   • ELIQUIS 5 MG Tab TAKE ONE TABLET BY MOUTH TWICE A  Tab 2   • terazosin (HYTRIN) 5 MG Cap TAKE ONE CAPSULE BY MOUTH DAILY (GENERIC FOR HYTRIN) 90 Cap 3   • omeprazole (PRILOSEC) 20 MG delayed-release capsule Take 1 Cap by mouth every day. 30 Cap 0   • Cyanocobalamin (VITAMIN B-12 PO) Take 2,000 mcg by mouth every day.     • [DISCONTINUED] hydrochlorothiazide (MICROZIDE) 12.5 MG capsule TAKE ONE CAPSULE BY MOUTH DAILY 90 Cap 0   • [DISCONTINUED] predniSONE (DELTASONE) 5 MG Tab TAKE ONE TABLET BY MOUTH DAILY AS NEEDED FOR FLARES OF OSTEOARTHRITIS - MAX 1 PER DAY- 30 Tab 0     No facility-administered encounter medications on file as of 8/10/2018.      ROS     Objective:   /64   Pulse 63   Ht 1.829 m (6')   Wt 84.8 kg (187 lb)   SpO2 96%   BMI 25.36 kg/m²     Physical Exam   Constitutional: He is oriented to person, place, and time. He appears well-developed and well-nourished.   HENT:   Head: Normocephalic and atraumatic.   Eyes: EOM are normal.   Neck: Normal range of motion. Neck supple.   Cardiovascular: Normal rate, regular rhythm and intact distal pulses.  Exam reveals no gallop and no friction rub.    No murmur heard.  Pulmonary/Chest: Effort normal and breath sounds normal.   Abdominal: Soft.   Musculoskeletal: Normal range of motion. He exhibits no edema.   Neurological: He is alert and oriented to person, place, and time.   Skin: Skin is warm and dry.   Psychiatric: He has a normal mood and affect. His behavior is normal. Judgment and thought content normal.        Assessment:     1. PAF (paroxysmal atrial fibrillation) ([facility])  EKG   2. S/P ablation of atrial fibrillation     3. Encounter for monitoring sotalol therapy     4. Anticoagulant long-term use         Medical Decision Making:  Today's Assessment / Status / Plan:   1.  Paroxysmal atrial fibrillation status post ablation.  Continue attempt at tapering sotalol but may require staying on it.  2.  Sotalol usage QTc 463.  3.  Anticoagulation continue Eliquis.  4.  Possible sleep apnea.  Patient does not want study.  5.  Follow-up with me in 6 months.

## 2018-08-10 NOTE — LETTER
[facility]   [address], [place] [facility] [address]  [place] [address]  Phone: [phone]  Fax: [phone]              Sherwin Hennessy  1940    Encounter Date: 8/10/2018    Cristian Guardado M.D.          PROGRESS NOTE:  Chief Complaint   Patient presents with   • Atrial Fibrillation     paf, fv       Subjective:   Sherwin Hennessy is a 78 y.o. male who presents today with previous ablation for paroxysmal atrial fibrillation with a PVI.  Occasional breakthroughs none since June.  Now on sotalol 80 twice a day.  Overall feeling well without any chest pain or shortness of breath.  On Eliquis.  Never a study for sleep apnea.    Past Medical History:   Diagnosis Date   • Arthritis    • BPH (benign prostatic hyperplasia)    • CAD (coronary artery disease)    • Cataract     bilat IOL   • Elevated PSA    • Heart burn    • Hiatal hernia    • High cholesterol    • Hypertension    • Neuropathy (HCC)    • PAF (paroxysmal atrial fibrillation) (HCC)    • Stroke (HCC)     tia x3     Past Surgical History:   Procedure Laterality Date   • EYE SURGERY Bilateral     cataract sx   • INGUINAL HERNIA REPAIR     • OTHER ABDOMINAL SURGERY      lap kortney   • VASECTOMY       Family History   Problem Relation Age of Onset   • Hypertension Mother    • Heart Disease Father    • Cancer Sister    • Cancer Brother    • Arthritis Other    • Diabetes Other      Social History     Social History   • Marital status: Single     Spouse name: N/A   • Number of children: N/A   • Years of education: N/A     Occupational History   • Retired      Social History Main Topics   • Smoking status: Never Smoker   • Smokeless tobacco: Never Used   • Alcohol use No      Comment: former etoh   • Drug use: No   • Sexual activity: Not on file     Other Topics Concern   • Not on file     Social History Narrative   • No narrative on file     Allergies   Allergen Reactions   • Amiodarone Unspecified     Felt like he was  going to die   • Food Hives     Watermelon, cantaloupe, strawberries, apples     Outpatient Encounter Prescriptions as of 8/10/2018   Medication Sig Dispense Refill   • cetirizine (ZYRTEC) 10 MG Tab Take 10 mg by mouth every day.     • pravastatin (PRAVACHOL) 20 MG Tab Take 1 Tab by mouth every day. 90 Tab 1   • hydrochlorothiazide (MICROZIDE) 12.5 MG capsule TAKE ONE CAPSULE BY MOUTH DAILY 90 Cap 2   • gabapentin (NEURONTIN) 300 MG Cap TAKE ONE CAPSULE BY MOUTH DAILY 90 Cap 0   • tramadol (ULTRAM) 50 MG Tab Take 1 Tab by mouth every 8 hours as needed for Moderate Pain or Severe Pain for up to 90 days. 180 Tab 0   • sotalol (BETAPACE) 80 MG Tab Take 1 Tab by mouth 2 times a day. 60 Tab 3   • ELIQUIS 5 MG Tab TAKE ONE TABLET BY MOUTH TWICE A  Tab 2   • terazosin (HYTRIN) 5 MG Cap TAKE ONE CAPSULE BY MOUTH DAILY (GENERIC FOR HYTRIN) 90 Cap 3   • omeprazole (PRILOSEC) 20 MG delayed-release capsule Take 1 Cap by mouth every day. 30 Cap 0   • Cyanocobalamin (VITAMIN B-12 PO) Take 2,000 mcg by mouth every day.     • [DISCONTINUED] hydrochlorothiazide (MICROZIDE) 12.5 MG capsule TAKE ONE CAPSULE BY MOUTH DAILY 90 Cap 0   • [DISCONTINUED] predniSONE (DELTASONE) 5 MG Tab TAKE ONE TABLET BY MOUTH DAILY AS NEEDED FOR FLARES OF OSTEOARTHRITIS - MAX 1 PER DAY- 30 Tab 0     No facility-administered encounter medications on file as of 8/10/2018.      ROS     Objective:   /64   Pulse 63   Ht 1.829 m (6')   Wt 84.8 kg (187 lb)   SpO2 96%   BMI 25.36 kg/m²      Physical Exam   Constitutional: He is oriented to person, place, and time. He appears well-developed and well-nourished.   HENT:   Head: Normocephalic and atraumatic.   Eyes: EOM are normal.   Neck: Normal range of motion. Neck supple.   Cardiovascular: Normal rate, regular rhythm and intact distal pulses.  Exam reveals no gallop and no friction rub.    No murmur heard.  Pulmonary/Chest: Effort normal and breath sounds normal.   Abdominal: Soft.      Musculoskeletal: Normal range of motion. He exhibits no edema.   Neurological: He is alert and oriented to person, place, and time.   Skin: Skin is warm and dry.   Psychiatric: He has a normal mood and affect. His behavior is normal. Judgment and thought content normal.       Assessment:     1. PAF (paroxysmal atrial fibrillation) ([facility])  EKG   2. S/P ablation of atrial fibrillation     3. Encounter for monitoring sotalol therapy     4. Anticoagulant long-term use         Medical Decision Making:  Today's Assessment / Status / Plan:   1.  Paroxysmal atrial fibrillation status post ablation.  Continue attempt at tapering sotalol but may require staying on it.  2.  Sotalol usage QTc 463.  3.  Anticoagulation continue Eliquis.  4.  Possible sleep apnea.  Patient does not want study.  5.  Follow-up with me in 6 months.      Shen Granger M.D.  [address] [address]  VIA In Basket

## 2019-05-14 ENCOUNTER — OFFICE VISIT (OUTPATIENT)
Dept: CARDIOLOGY | Facility: CLINIC | Age: 79
End: 2019-05-14
Payer: MEDICARE

## 2019-05-14 VITALS
OXYGEN SATURATION: 93 % | BODY MASS INDEX: 25.6 KG/M2 | HEIGHT: 72 IN | WEIGHT: 189 LBS | HEART RATE: 78 BPM | SYSTOLIC BLOOD PRESSURE: 150 MMHG | DIASTOLIC BLOOD PRESSURE: 70 MMHG

## 2019-05-14 DIAGNOSIS — Z79.899 ENCOUNTER FOR MONITORING SOTALOL THERAPY: ICD-10-CM

## 2019-05-14 DIAGNOSIS — Z51.81 ENCOUNTER FOR MONITORING SOTALOL THERAPY: ICD-10-CM

## 2019-05-14 DIAGNOSIS — Z79.01 ANTICOAGULANT LONG-TERM USE: ICD-10-CM

## 2019-05-14 DIAGNOSIS — Z78.9 STATIN INTOLERANCE: Chronic | ICD-10-CM

## 2019-05-14 DIAGNOSIS — E78.5 DYSLIPIDEMIA: ICD-10-CM

## 2019-05-14 DIAGNOSIS — Z86.79 S/P ABLATION OF ATRIAL FIBRILLATION: ICD-10-CM

## 2019-05-14 DIAGNOSIS — Z98.890 S/P ABLATION OF ATRIAL FIBRILLATION: ICD-10-CM

## 2019-05-14 DIAGNOSIS — I48.0 PAROXYSMAL ATRIAL FIBRILLATION (HCC): ICD-10-CM

## 2019-05-14 LAB — EKG IMPRESSION: NORMAL

## 2019-05-14 PROCEDURE — 99214 OFFICE O/P EST MOD 30 MIN: CPT | Performed by: INTERNAL MEDICINE

## 2019-05-14 PROCEDURE — 93000 ELECTROCARDIOGRAM COMPLETE: CPT | Performed by: INTERNAL MEDICINE

## 2019-05-14 RX ORDER — EZETIMIBE 10 MG/1
10 TABLET ORAL DAILY
COMMUNITY
End: 2019-05-14

## 2019-05-14 ASSESSMENT — ENCOUNTER SYMPTOMS
FOCAL WEAKNESS: 0
NAUSEA: 0
FEVER: 0
PND: 0
CHILLS: 0
ABDOMINAL PAIN: 0
DIZZINESS: 0
BLURRED VISION: 0
PALPITATIONS: 0
FALLS: 0
BRUISES/BLEEDS EASILY: 0
SORE THROAT: 0
SHORTNESS OF BREATH: 0
WEAKNESS: 0
COUGH: 0
CLAUDICATION: 0

## 2019-05-14 NOTE — PATIENT INSTRUCTIONS
Please look into the following diets and incorporate them into your diet    LOW SALT DIET   KEEP YOUR SODIUM EQUAL TO CALORIES AND NO MORE THAN DOUBLE THE CALORIES FOR A LOW SALT DIET    FOR TREATMENT OR PREVENTION OF CORONARY ARTERY DISEASE    John - [facility]    Dr. Gilbert[facility] for Reversing Heart Disease - Juan Childs's Cardiologist    [facility]    Dr Sim - [place] [facility] (book and documentary)      FOR TREATMENT OF BLOOD PRESSURE  DASH DIET - American Heart Association for treatment of HYPERTENSION    FOR TREATMENT OF BAD CHOLESTEROL/FATS  REDUCE PROCESSED SUGAR AS MUCH AS POSSIBLE  INCREASE WHOLE GRAINS/VEGETABLES    Lowering total cholesterol and LDL (bad) cholesterol:  - Eat leaner cuts of meat, or eliminate altogether if possible red meat, and frequently substitute fish or chicken.  - Limit saturated fat to no more than 7-10% of total calories no more than 10 g per day is recommended. Some sources of saturated fat include butter, animal fats, hydrogenated vegetable fats and oils, many desserts, whole milk dairy products.  - Replaced saturated fats with polyunsaturated fats and monounsaturated fats. Foods high in monounsaturated fat include nuts, although well, canola oil, avocados, and olives.  - Limit trans fat (processed foods) and replaced with fresh fruits and vegetables  - Recommend nonfat dairy products  - Increase substantially the amount of soluble fiber intake (legumes such as beans, fruit, whole grains).  - Consider nutritional supplements: plant sterile spreads such as Benecol, fish oil,  flaxseed oil, omega-3 acids capsules 1000 mg twice a day, or viscous fiber such as Metamucil  - Attain ideal weight and regular exercise (at least 30 minutes per day of walking)    Lowering triglycerides:  - Reduce intake of simple sugar: Desserts, candy, pastries, honey, sodas, sugared cereals, yogurt, Gatorade, sports bars, canned  fruit, smoothies, fruit juice, coffee drinks  - Reduced intake of refined starches: Refined Pasta  - Reduce or abstain from alcohol  - Increase omega-3 fatty acids: [place], Trout, Mackerel, Herring, Albacore tuna and supplements  - Attain ideal weight and regular exercise (at least 30 minutes per day of walking)      Elevating HDL (good) cholesterol:  - Increase physical activity  - Seasoned foods with garlic and onions  - Increase omega-3 fatty acids and supplements as listed above  - Incorporating appropriate amounts of monounsaturated fats such as nuts, olive oil, canola oil, avocados, olives  - Stop smoking  - Attain ideal weight and regular exercise (at least 30 minutes per day of walking)

## 2019-05-15 NOTE — PROGRESS NOTES
Chief Complaint   Patient presents with   • Atrial Fibrillation       Subjective:   Sherwin Hennessy is a 79 y.o. male who presents today for follow-up of his history of a A. fib ablation and on sotalol therapy    He has been doing well for the last 6 months after following up with the EP no arrhythmias tolerating anticoagulation well    Past Medical History:   Diagnosis Date   • Arthritis    • BPH (benign prostatic hyperplasia)    • CAD (coronary artery disease)    • Cataract     bilat IOL   • Elevated PSA     Aldana   • Heart burn    • Hiatal hernia    • High cholesterol    • Hypertension    • Neuropathy (HCC)    • PAF (paroxysmal atrial fibrillation) (HCC)     Guardado   • Skin cancer     Helfman q 6 mos.   • Stroke (HCC)     tia x3     Past Surgical History:   Procedure Laterality Date   • KORTNEY BY LAPAROSCOPY      lap kortney   • EYE SURGERY Bilateral     cataract sx   • INGUINAL HERNIA REPAIR     • VASECTOMY       Family History   Problem Relation Age of Onset   • Hypertension Mother    • Heart Disease Father    • Cancer Sister    • Cancer Brother         colon   • Arthritis Other    • Diabetes Other    • Cancer Sister         colon     Social History     Social History   • Marital status: Single     Spouse name: N/A   • Number of children: N/A   • Years of education: N/A     Occupational History   • Retired      Social History Main Topics   • Smoking status: Never Smoker   • Smokeless tobacco: Never Used   • Alcohol use No      Comment: former etoh   • Drug use: No   • Sexual activity: Not on file     Other Topics Concern   • Not on file     Social History Narrative   • No narrative on file     Allergies   Allergen Reactions   • Amiodarone Unspecified     Felt like he was going to die   • Food Hives     Watermelon, cantaloupe, strawberries, apples   • Statins [Hmg-Coa-R Inhibitors]      Tried mutlipe caused myalgias     Outpatient Encounter Prescriptions as of 5/14/2019   Medication Sig Dispense Refill   •  predniSONE (DELTASONE) 5 MG Tab TAKE ONE TABLET BY MOUTH DAILY AS NEEDED FOR FLARES OF OSTEOARTHRITIS - MAX OF ONE PER DAY- 30 Tab 0   • tramadol (ULTRAM) 50 MG Tab Take 1 Tab by mouth 2 times a day as needed for up to 90 days. 180 Tab 0   • hydrochlorothiazide (MICROZIDE) 12.5 MG capsule TAKE ONE CAPSULE BY MOUTH DAILY 90 Cap 1   • terazosin (HYTRIN) 5 MG Cap TAKE ONE CAPSULE BY MOUTH DAILY 90 Cap 2   • gabapentin (NEURONTIN) 300 MG Cap TAKE ONE CAPSULE BY MOUTH DAILY 90 Cap 2   • sotalol (BETAPACE) 80 MG Tab Take 0.5 Tabs by mouth 2 times a day. 30 Tab 1   • apixaban (ELIQUIS) 5mg Tab Take 1 Tab by mouth 2 Times a Day. TAKE ONE TABLET BY MOUTH TWICE A  Tab 3   • cetirizine (ZYRTEC) 10 MG Tab Take 10 mg by mouth every day.     • omeprazole (PRILOSEC) 20 MG delayed-release capsule Take 1 Cap by mouth every day. 30 Cap 0   • [DISCONTINUED] ezetimibe (ZETIA) 10 MG Tab Take 10 mg by mouth every day.     • [DISCONTINUED] fenofibrate (TRICOR) 145 MG Tab Take 1 Tab by mouth every day. 30 Tab 11   • [DISCONTINUED] Cyanocobalamin (VITAMIN B-12 PO) Take 2,000 mcg by mouth every day.       No facility-administered encounter medications on file as of 5/14/2019.      Review of Systems   Constitutional: Negative for chills and fever.   HENT: Negative for sore throat.    Eyes: Negative for blurred vision.   Respiratory: Negative for cough and shortness of breath.    Cardiovascular: Negative for chest pain, palpitations, claudication, leg swelling and PND.   Gastrointestinal: Negative for abdominal pain and nausea.   Musculoskeletal: Negative for falls and joint pain.   Skin: Negative for rash.   Neurological: Negative for dizziness, focal weakness and weakness.   Endo/Heme/Allergies: Does not bruise/bleed easily.        Objective:   /70 (BP Location: Right arm, Patient Position: Sitting)   Pulse 78   Ht 1.829 m (6')   Wt 85.7 kg (189 lb)   SpO2 93%   BMI 25.63 kg/m²     Physical Exam   Constitutional: No  distress.   HENT:   Mouth/Throat: Oropharynx is clear and moist. No oropharyngeal exudate.   Eyes: No scleral icterus.   Neck: No JVD present.   Cardiovascular: Normal rate and normal heart sounds.  Exam reveals no gallop and no friction rub.    No murmur heard.  Pulmonary/Chest: No respiratory distress. He has no wheezes. He has no rales.   Abdominal: Soft. Bowel sounds are normal.   Musculoskeletal: He exhibits no edema.   Neurological: He is alert.   Skin: No rash noted. He is not diaphoretic.   Psychiatric: He has a normal mood and affect.     We reviewed in person the most recent labs  Recent Results (from the past 4032 hour(s))   Lipid Profile    Collection Time: 19 12:00 AM   Result Value Ref Range    Cholesterol,Tot 170 120 - 200 mg/dL    Triglycerides 136 0 - 150 mg/dL     (H) <100 mg/dL    HDL 33.0 (L) 40.0 - 60.0 mg/dL    Chol-Hdl Ratio 5.15     Non HDL Cholesterol 137 30 - 160   Lipid Profile    Collection Time: 19  7:13 AM   Result Value Ref Range    Cholesterol,Tot 174 120 - 200 mg/dL    Triglycerides 81 0 - 150 mg/dL     (H) <100 mg/dL    HDL 39.0 (L) 40.0 - 60.0 mg/dL    Chol-Hdl Ratio 4.46     Non HDL Cholesterol 135 30 - 160   EKG    Collection Time: 19  9:11 AM   Result Value Ref Range    Report       [facility]    Test Date:  2019  Pt Name:    GUILLE UP               Department: [facility]  MRN:        [id number]                      Room:  Gender:     Male                         Technician: ASHLYN  :        1940                   Requested By:SHANTA HOLCOMB  Order #:    [id number]                    Reading MD: Shanta Holcomb MD    Measurements  Intervals                                Axis  Rate:       72                           P:          -14  OH:         188                          QRS:        15  QRSD:       96                           T:          45  QT:         424  QTc:        465    Interpretive Statements  SINUS  RHYTHM  VENTRICULAR PREMATURE COMPLEX  Compared to ECG 08/10/2018 10:05:21  Ventricular premature complex(es) now present    Electronically Signed On 5- 11:10:11 PDT by Thom Gudino MD         Assessment:     1. Paroxysmal atrial fibrillation (HCC)  EKG    CBC WITH DIFFERENTIAL    Comp Metabolic Panel   2. Dyslipidemia     3. Encounter for monitoring sotalol therapy     4. S/P ablation of atrial fibrillation     5. Anticoagulant long-term use  CBC WITH DIFFERENTIAL    Comp Metabolic Panel   6. Statin intolerance         Medical Decision Making:  Today's Assessment / Status / Plan:     It was my pleasure to meet with Mr. Hennessy.    He does have statin intolerance even reported side effects from fenofibrate and likely Zetia as well just encouraged him to have a healthy diet does not seem to meet criteria for PCSK9 which he is unlikely to pursue anyway    He understands the risks and benefits of chronic anticoagulation    We discussed on the sotalol he needs to monitor lab work at least once if not twice a year, his QTC was stable    I will see Mr. Hennessy back in 1 year time and encouraged him to follow up with us over the phone or e-mail using my [employer] as issues arise.    It is my pleasure to participate in the care of Mr. Hennessy.  Please do not hesitate to contact me with questions or concerns.    Thom Gudino MD PhD FAC  Cardiologist [facility]    Please note that this dictation was created using voice recognition software. I have worked with consultants from the vendor as well as technical experts from [facility] to optimize the interface. I have made every reasonable attempt to correct obvious errors, but I expect that there are errors of grammar and possibly content I did not discover before finalizing the note.

## 2019-08-22 ENCOUNTER — TELEPHONE (OUTPATIENT)
Dept: CARDIOLOGY | Facility: MEDICAL CENTER | Age: 79
End: 2019-08-22

## 2019-08-22 NOTE — TELEPHONE ENCOUNTER
Returned Kaylah's phone call and spoke to Nayely.  Reassurance given that request will be relayed to MD for advise.  Fax number received, [phone].    Clearance request relayed to MD for advise.

## 2019-08-22 NOTE — TELEPHONE ENCOUNTER
CW/al Adrian from Dr [facility] office to ask if pt can stop eliquis  starting today or tomorrow for a prostate biopsy 8/27.  Kaylah is leaving the office momentarily and   not returning until next week, so in her absence please call Nayely who is covering her.  Nayely is at .

## 2019-08-23 NOTE — TELEPHONE ENCOUNTER
Letter printed with MD recommendations.    Letter faxed to Dr. Aldana's office, [phone], completed status.    Called patient and reviewed MD recommendations.  He verbalizes understanding and is appreciative of information given.

## 2019-08-23 NOTE — TELEPHONE ENCOUNTER
He is safe to proceed, no testing required, hold anticoagulation as necessary.    It is my pleasure to participate in the care of Mr. Hennessy .  Please do not hesitate to contact me with questions or concerns. [facility] is available 24/7 for consultative services at [phone] in the perioperative period.    Electronically Signed    Thom Gudino MD PhD [facility]  Cardiologist [facility]    Please note that this dictation was created using voice recognition software. I have worked with consultants from the vendor as well as technical experts from [facility] to optimize the interface. I have made every reasonable attempt to correct obvious errors, but I expect that there are errors of grammar and possibly content I did not discover before finalizing the note.

## 2019-11-18 ENCOUNTER — TELEPHONE (OUTPATIENT)
Dept: CARDIOLOGY | Facility: MEDICAL CENTER | Age: 79
End: 2019-11-18

## 2019-11-18 NOTE — TELEPHONE ENCOUNTER
Associated Results   Result Notes for Comp Metabolic Panel   Notes recorded by Thom Gudino M.D. on 11/13/2019 at 3:28 PM PST    Labs look good, please let him know     Thank you     Letter printed with MD recommendations and mailed to pt mailing address.

## 2022-06-14 PROBLEM — I10 ESSENTIAL HYPERTENSION: Status: ACTIVE | Noted: 2021-01-07

## 2022-06-14 PROBLEM — I48.0 PAROXYSMAL ATRIAL FIBRILLATION (HCC): Chronic | Status: ACTIVE | Noted: 2021-01-07

## 2022-12-14 PROBLEM — G89.4 CHRONIC PAIN SYNDROME: Status: ACTIVE | Noted: 2022-12-14

## 2023-05-16 NOTE — TELEPHONE ENCOUNTER
----- Message from Norma Her sent at 3/5/2018  9:16 AM PST -----  Regarding: patient calling back after 02/26 ablation  TERESA/Claire      Patient had ablation on 02/26. He said he needs to speak to someone about his heart (would not elaborate further). He can be reached at [phone].         no edema, no murmurs, regular rate and rhythm